# Patient Record
Sex: FEMALE | Race: WHITE | URBAN - METROPOLITAN AREA
[De-identification: names, ages, dates, MRNs, and addresses within clinical notes are randomized per-mention and may not be internally consistent; named-entity substitution may affect disease eponyms.]

---

## 2020-03-02 ENCOUNTER — INPATIENT (INPATIENT)
Facility: HOSPITAL | Age: 65
LOS: 3 days | Discharge: HOME | DRG: 247 | End: 2020-03-06
Attending: INTERNAL MEDICINE | Admitting: FAMILY MEDICINE
Payer: MEDICARE

## 2020-03-02 VITALS
HEART RATE: 88 BPM | DIASTOLIC BLOOD PRESSURE: 91 MMHG | SYSTOLIC BLOOD PRESSURE: 176 MMHG | RESPIRATION RATE: 16 BRPM | WEIGHT: 199.96 LBS | OXYGEN SATURATION: 98 % | HEIGHT: 63 IN

## 2020-03-02 DIAGNOSIS — I21.4 NON-ST ELEVATION (NSTEMI) MYOCARDIAL INFARCTION: ICD-10-CM

## 2020-03-02 DIAGNOSIS — I21.3 ST ELEVATION (STEMI) MYOCARDIAL INFARCTION OF UNSPECIFIED SITE: ICD-10-CM

## 2020-03-02 LAB
ADD ON TEST-SPECIMEN IN LAB: SIGNIFICANT CHANGE UP
ALBUMIN SERPL ELPH-MCNC: 3.1 G/DL — LOW (ref 3.3–5)
ALBUMIN SERPL ELPH-MCNC: 3.5 G/DL — SIGNIFICANT CHANGE UP (ref 3.3–5)
ALBUMIN SERPL ELPH-MCNC: 4 G/DL — SIGNIFICANT CHANGE UP (ref 3.3–5)
ALP SERPL-CCNC: 52 U/L — SIGNIFICANT CHANGE UP (ref 40–120)
ALP SERPL-CCNC: 60 U/L — SIGNIFICANT CHANGE UP (ref 40–120)
ALP SERPL-CCNC: 72 U/L — SIGNIFICANT CHANGE UP (ref 40–120)
ALT FLD-CCNC: 26 U/L — SIGNIFICANT CHANGE UP (ref 12–78)
ALT FLD-CCNC: 28 U/L — SIGNIFICANT CHANGE UP (ref 12–78)
ALT FLD-CCNC: 45 U/L — SIGNIFICANT CHANGE UP (ref 12–78)
ANION GAP SERPL CALC-SCNC: 6 MMOL/L — SIGNIFICANT CHANGE UP (ref 5–17)
ANION GAP SERPL CALC-SCNC: 7 MMOL/L — SIGNIFICANT CHANGE UP (ref 5–17)
ANION GAP SERPL CALC-SCNC: 8 MMOL/L — SIGNIFICANT CHANGE UP (ref 5–17)
ANION GAP SERPL CALC-SCNC: 9 MMOL/L — SIGNIFICANT CHANGE UP (ref 5–17)
AST SERPL-CCNC: 184 U/L — HIGH (ref 15–37)
AST SERPL-CCNC: 20 U/L — SIGNIFICANT CHANGE UP (ref 15–37)
AST SERPL-CCNC: 54 U/L — HIGH (ref 15–37)
BILIRUB SERPL-MCNC: 0.2 MG/DL — SIGNIFICANT CHANGE UP (ref 0.2–1.2)
BILIRUB SERPL-MCNC: 0.3 MG/DL — SIGNIFICANT CHANGE UP (ref 0.2–1.2)
BILIRUB SERPL-MCNC: 0.3 MG/DL — SIGNIFICANT CHANGE UP (ref 0.2–1.2)
BUN SERPL-MCNC: 12 MG/DL — SIGNIFICANT CHANGE UP (ref 7–23)
BUN SERPL-MCNC: 13 MG/DL — SIGNIFICANT CHANGE UP (ref 7–23)
BUN SERPL-MCNC: 14 MG/DL — SIGNIFICANT CHANGE UP (ref 7–23)
BUN SERPL-MCNC: 19 MG/DL — SIGNIFICANT CHANGE UP (ref 7–23)
CALCIUM SERPL-MCNC: 10.2 MG/DL — HIGH (ref 8.5–10.1)
CALCIUM SERPL-MCNC: 7.6 MG/DL — LOW (ref 8.5–10.1)
CALCIUM SERPL-MCNC: 8.5 MG/DL — SIGNIFICANT CHANGE UP (ref 8.5–10.1)
CALCIUM SERPL-MCNC: 8.9 MG/DL — SIGNIFICANT CHANGE UP (ref 8.5–10.1)
CHLORIDE SERPL-SCNC: 107 MMOL/L — SIGNIFICANT CHANGE UP (ref 96–108)
CHLORIDE SERPL-SCNC: 108 MMOL/L — SIGNIFICANT CHANGE UP (ref 96–108)
CHLORIDE SERPL-SCNC: 109 MMOL/L — HIGH (ref 96–108)
CHLORIDE SERPL-SCNC: 110 MMOL/L — HIGH (ref 96–108)
CO2 SERPL-SCNC: 21 MMOL/L — LOW (ref 22–31)
CO2 SERPL-SCNC: 24 MMOL/L — SIGNIFICANT CHANGE UP (ref 22–31)
CO2 SERPL-SCNC: 24 MMOL/L — SIGNIFICANT CHANGE UP (ref 22–31)
CO2 SERPL-SCNC: 25 MMOL/L — SIGNIFICANT CHANGE UP (ref 22–31)
CREAT SERPL-MCNC: 0.58 MG/DL — SIGNIFICANT CHANGE UP (ref 0.5–1.3)
CREAT SERPL-MCNC: 0.63 MG/DL — SIGNIFICANT CHANGE UP (ref 0.5–1.3)
CREAT SERPL-MCNC: 0.68 MG/DL — SIGNIFICANT CHANGE UP (ref 0.5–1.3)
CREAT SERPL-MCNC: 0.91 MG/DL — SIGNIFICANT CHANGE UP (ref 0.5–1.3)
GLUCOSE SERPL-MCNC: 115 MG/DL — HIGH (ref 70–99)
GLUCOSE SERPL-MCNC: 127 MG/DL — HIGH (ref 70–99)
GLUCOSE SERPL-MCNC: 139 MG/DL — HIGH (ref 70–99)
GLUCOSE SERPL-MCNC: 272 MG/DL — HIGH (ref 70–99)
HCT VFR BLD CALC: 37.7 % — SIGNIFICANT CHANGE UP (ref 34.5–45)
HCT VFR BLD CALC: 40.5 % — SIGNIFICANT CHANGE UP (ref 34.5–45)
HGB BLD-MCNC: 12.9 G/DL — SIGNIFICANT CHANGE UP (ref 11.5–15.5)
HGB BLD-MCNC: 13.9 G/DL — SIGNIFICANT CHANGE UP (ref 11.5–15.5)
LIDOCAIN IGE QN: 82 U/L — SIGNIFICANT CHANGE UP (ref 73–393)
MAGNESIUM SERPL-MCNC: 2.1 MG/DL — SIGNIFICANT CHANGE UP (ref 1.6–2.6)
MAGNESIUM SERPL-MCNC: 2.1 MG/DL — SIGNIFICANT CHANGE UP (ref 1.6–2.6)
MAGNESIUM SERPL-MCNC: 2.2 MG/DL — SIGNIFICANT CHANGE UP (ref 1.6–2.6)
MCHC RBC-ENTMCNC: 30.2 PG — SIGNIFICANT CHANGE UP (ref 27–34)
MCHC RBC-ENTMCNC: 30.4 PG — SIGNIFICANT CHANGE UP (ref 27–34)
MCHC RBC-ENTMCNC: 34.2 GM/DL — SIGNIFICANT CHANGE UP (ref 32–36)
MCHC RBC-ENTMCNC: 34.3 GM/DL — SIGNIFICANT CHANGE UP (ref 32–36)
MCV RBC AUTO: 87.9 FL — SIGNIFICANT CHANGE UP (ref 80–100)
MCV RBC AUTO: 88.9 FL — SIGNIFICANT CHANGE UP (ref 80–100)
NT-PROBNP SERPL-SCNC: 45 PG/ML — SIGNIFICANT CHANGE UP (ref 0–125)
PHOSPHATE SERPL-MCNC: 3.1 MG/DL — SIGNIFICANT CHANGE UP (ref 2.5–4.5)
PLATELET # BLD AUTO: 206 K/UL — SIGNIFICANT CHANGE UP (ref 150–400)
PLATELET # BLD AUTO: 268 K/UL — SIGNIFICANT CHANGE UP (ref 150–400)
POTASSIUM SERPL-MCNC: 3 MMOL/L — LOW (ref 3.5–5.3)
POTASSIUM SERPL-MCNC: 3.5 MMOL/L — SIGNIFICANT CHANGE UP (ref 3.5–5.3)
POTASSIUM SERPL-MCNC: 3.6 MMOL/L — SIGNIFICANT CHANGE UP (ref 3.5–5.3)
POTASSIUM SERPL-MCNC: 4 MMOL/L — SIGNIFICANT CHANGE UP (ref 3.5–5.3)
POTASSIUM SERPL-SCNC: 3 MMOL/L — LOW (ref 3.5–5.3)
POTASSIUM SERPL-SCNC: 3.5 MMOL/L — SIGNIFICANT CHANGE UP (ref 3.5–5.3)
POTASSIUM SERPL-SCNC: 3.6 MMOL/L — SIGNIFICANT CHANGE UP (ref 3.5–5.3)
POTASSIUM SERPL-SCNC: 4 MMOL/L — SIGNIFICANT CHANGE UP (ref 3.5–5.3)
PROT SERPL-MCNC: 5.7 GM/DL — LOW (ref 6–8.3)
PROT SERPL-MCNC: 7 GM/DL — SIGNIFICANT CHANGE UP (ref 6–8.3)
PROT SERPL-MCNC: 7.6 GM/DL — SIGNIFICANT CHANGE UP (ref 6–8.3)
RBC # BLD: 4.24 M/UL — SIGNIFICANT CHANGE UP (ref 3.8–5.2)
RBC # BLD: 4.61 M/UL — SIGNIFICANT CHANGE UP (ref 3.8–5.2)
RBC # FLD: 13.1 % — SIGNIFICANT CHANGE UP (ref 10.3–14.5)
RBC # FLD: 13.2 % — SIGNIFICANT CHANGE UP (ref 10.3–14.5)
SODIUM SERPL-SCNC: 138 MMOL/L — SIGNIFICANT CHANGE UP (ref 135–145)
SODIUM SERPL-SCNC: 139 MMOL/L — SIGNIFICANT CHANGE UP (ref 135–145)
SODIUM SERPL-SCNC: 139 MMOL/L — SIGNIFICANT CHANGE UP (ref 135–145)
SODIUM SERPL-SCNC: 142 MMOL/L — SIGNIFICANT CHANGE UP (ref 135–145)
TROPONIN I SERPL-MCNC: 0.05 NG/ML — HIGH (ref 0.01–0.04)
TROPONIN I SERPL-MCNC: 52 NG/ML — HIGH (ref 0.01–0.04)
WBC # BLD: 7.07 K/UL — SIGNIFICANT CHANGE UP (ref 3.8–10.5)
WBC # BLD: 9.08 K/UL — SIGNIFICANT CHANGE UP (ref 3.8–10.5)
WBC # FLD AUTO: 7.07 K/UL — SIGNIFICANT CHANGE UP (ref 3.8–10.5)
WBC # FLD AUTO: 9.08 K/UL — SIGNIFICANT CHANGE UP (ref 3.8–10.5)

## 2020-03-02 PROCEDURE — C9600: CPT | Mod: LD

## 2020-03-02 PROCEDURE — 93458 L HRT ARTERY/VENTRICLE ANGIO: CPT | Mod: 59

## 2020-03-02 PROCEDURE — 84443 ASSAY THYROID STIM HORMONE: CPT

## 2020-03-02 PROCEDURE — 80053 COMPREHEN METABOLIC PANEL: CPT

## 2020-03-02 PROCEDURE — 80048 BASIC METABOLIC PNL TOTAL CA: CPT

## 2020-03-02 PROCEDURE — 82550 ASSAY OF CK (CPK): CPT

## 2020-03-02 PROCEDURE — 83735 ASSAY OF MAGNESIUM: CPT

## 2020-03-02 PROCEDURE — 83036 HEMOGLOBIN GLYCOSYLATED A1C: CPT

## 2020-03-02 PROCEDURE — 99223 1ST HOSP IP/OBS HIGH 75: CPT

## 2020-03-02 PROCEDURE — 92920 PRQ TRLUML C ANGIOP 1ART&/BR: CPT | Mod: LD,78

## 2020-03-02 PROCEDURE — 99221 1ST HOSP IP/OBS SF/LOW 40: CPT

## 2020-03-02 PROCEDURE — 85025 COMPLETE CBC W/AUTO DIFF WBC: CPT

## 2020-03-02 PROCEDURE — 93308 TTE F-UP OR LMTD: CPT

## 2020-03-02 PROCEDURE — 86803 HEPATITIS C AB TEST: CPT

## 2020-03-02 PROCEDURE — 71045 X-RAY EXAM CHEST 1 VIEW: CPT

## 2020-03-02 PROCEDURE — 36415 COLL VENOUS BLD VENIPUNCTURE: CPT

## 2020-03-02 PROCEDURE — 93010 ELECTROCARDIOGRAM REPORT: CPT | Mod: 76

## 2020-03-02 PROCEDURE — 93005 ELECTROCARDIOGRAM TRACING: CPT

## 2020-03-02 PROCEDURE — 84100 ASSAY OF PHOSPHORUS: CPT

## 2020-03-02 PROCEDURE — G0269: CPT

## 2020-03-02 PROCEDURE — 93306 TTE W/DOPPLER COMPLETE: CPT | Mod: 26

## 2020-03-02 PROCEDURE — 84484 ASSAY OF TROPONIN QUANT: CPT

## 2020-03-02 PROCEDURE — 71045 X-RAY EXAM CHEST 1 VIEW: CPT | Mod: 26

## 2020-03-02 PROCEDURE — 80061 LIPID PANEL: CPT

## 2020-03-02 RX ORDER — CLOPIDOGREL BISULFATE 75 MG/1
75 TABLET, FILM COATED ORAL DAILY
Refills: 0 | Status: DISCONTINUED | OUTPATIENT
Start: 2020-03-03 | End: 2020-03-06

## 2020-03-02 RX ORDER — MAGNESIUM SULFATE 500 MG/ML
2 VIAL (ML) INJECTION ONCE
Refills: 0 | Status: COMPLETED | OUTPATIENT
Start: 2020-03-02 | End: 2020-03-02

## 2020-03-02 RX ORDER — NITROGLYCERIN 6.5 MG
0.4 CAPSULE, EXTENDED RELEASE ORAL
Refills: 0 | Status: DISCONTINUED | OUTPATIENT
Start: 2020-03-02 | End: 2020-03-06

## 2020-03-02 RX ORDER — LIDOCAINE HCL 20 MG/ML
100 VIAL (ML) INJECTION ONCE
Refills: 0 | Status: COMPLETED | OUTPATIENT
Start: 2020-03-02 | End: 2020-03-02

## 2020-03-02 RX ORDER — CLOPIDOGREL BISULFATE 75 MG/1
300 TABLET, FILM COATED ORAL ONCE
Refills: 0 | Status: DISCONTINUED | OUTPATIENT
Start: 2020-03-02 | End: 2020-03-02

## 2020-03-02 RX ORDER — HEPARIN SODIUM 5000 [USP'U]/ML
5000 INJECTION INTRAVENOUS; SUBCUTANEOUS EVERY 12 HOURS
Refills: 0 | Status: DISCONTINUED | OUTPATIENT
Start: 2020-03-02 | End: 2020-03-06

## 2020-03-02 RX ORDER — METOPROLOL TARTRATE 50 MG
25 TABLET ORAL
Refills: 0 | Status: DISCONTINUED | OUTPATIENT
Start: 2020-03-02 | End: 2020-03-04

## 2020-03-02 RX ORDER — ZOLPIDEM TARTRATE 10 MG/1
1 TABLET ORAL
Qty: 0 | Refills: 0 | DISCHARGE

## 2020-03-02 RX ORDER — ATORVASTATIN CALCIUM 80 MG/1
40 TABLET, FILM COATED ORAL AT BEDTIME
Refills: 0 | Status: DISCONTINUED | OUTPATIENT
Start: 2020-03-02 | End: 2020-03-06

## 2020-03-02 RX ORDER — POTASSIUM CHLORIDE 20 MEQ
10 PACKET (EA) ORAL
Refills: 0 | Status: COMPLETED | OUTPATIENT
Start: 2020-03-02 | End: 2020-03-02

## 2020-03-02 RX ORDER — MORPHINE SULFATE 50 MG/1
4 CAPSULE, EXTENDED RELEASE ORAL ONCE
Refills: 0 | Status: DISCONTINUED | OUTPATIENT
Start: 2020-03-02 | End: 2020-03-02

## 2020-03-02 RX ORDER — CLOPIDOGREL BISULFATE 75 MG/1
600 TABLET, FILM COATED ORAL ONCE
Refills: 0 | Status: COMPLETED | OUTPATIENT
Start: 2020-03-02 | End: 2020-03-02

## 2020-03-02 RX ORDER — ZOLPIDEM TARTRATE 10 MG/1
5 TABLET ORAL AT BEDTIME
Refills: 0 | Status: DISCONTINUED | OUTPATIENT
Start: 2020-03-02 | End: 2020-03-06

## 2020-03-02 RX ORDER — ONDANSETRON 8 MG/1
4 TABLET, FILM COATED ORAL ONCE
Refills: 0 | Status: COMPLETED | OUTPATIENT
Start: 2020-03-02 | End: 2020-03-02

## 2020-03-02 RX ORDER — PANTOPRAZOLE SODIUM 20 MG/1
40 TABLET, DELAYED RELEASE ORAL
Refills: 0 | Status: DISCONTINUED | OUTPATIENT
Start: 2020-03-02 | End: 2020-03-03

## 2020-03-02 RX ORDER — ACETAMINOPHEN 500 MG
650 TABLET ORAL EVERY 6 HOURS
Refills: 0 | Status: DISCONTINUED | OUTPATIENT
Start: 2020-03-02 | End: 2020-03-06

## 2020-03-02 RX ORDER — LISINOPRIL 2.5 MG/1
5 TABLET ORAL DAILY
Refills: 0 | Status: DISCONTINUED | OUTPATIENT
Start: 2020-03-02 | End: 2020-03-06

## 2020-03-02 RX ORDER — NITROGLYCERIN 6.5 MG
0.4 CAPSULE, EXTENDED RELEASE ORAL ONCE
Refills: 0 | Status: COMPLETED | OUTPATIENT
Start: 2020-03-02 | End: 2020-03-02

## 2020-03-02 RX ORDER — LEVOTHYROXINE SODIUM 125 MCG
125 TABLET ORAL DAILY
Refills: 0 | Status: DISCONTINUED | OUTPATIENT
Start: 2020-03-02 | End: 2020-03-06

## 2020-03-02 RX ORDER — LIDOCAINE HCL 20 MG/ML
1 VIAL (ML) INJECTION
Qty: 2 | Refills: 0 | Status: DISCONTINUED | OUTPATIENT
Start: 2020-03-02 | End: 2020-03-02

## 2020-03-02 RX ORDER — SODIUM CHLORIDE 9 MG/ML
1000 INJECTION INTRAMUSCULAR; INTRAVENOUS; SUBCUTANEOUS
Refills: 0 | Status: DISCONTINUED | OUTPATIENT
Start: 2020-03-02 | End: 2020-03-02

## 2020-03-02 RX ORDER — DEXTROSE MONOHYDRATE, SODIUM CHLORIDE, AND POTASSIUM CHLORIDE 50; .745; 4.5 G/1000ML; G/1000ML; G/1000ML
1000 INJECTION, SOLUTION INTRAVENOUS
Refills: 0 | Status: DISCONTINUED | OUTPATIENT
Start: 2020-03-02 | End: 2020-03-03

## 2020-03-02 RX ORDER — ASPIRIN/CALCIUM CARB/MAGNESIUM 324 MG
81 TABLET ORAL DAILY
Refills: 0 | Status: DISCONTINUED | OUTPATIENT
Start: 2020-03-03 | End: 2020-03-06

## 2020-03-02 RX ADMIN — ONDANSETRON 4 MILLIGRAM(S): 8 TABLET, FILM COATED ORAL at 13:10

## 2020-03-02 RX ADMIN — Medication 0.4 MILLIGRAM(S): at 15:40

## 2020-03-02 RX ADMIN — Medication 100 MILLIEQUIVALENT(S): at 15:50

## 2020-03-02 RX ADMIN — MORPHINE SULFATE 4 MILLIGRAM(S): 50 CAPSULE, EXTENDED RELEASE ORAL at 13:10

## 2020-03-02 RX ADMIN — CLOPIDOGREL BISULFATE 600 MILLIGRAM(S): 75 TABLET, FILM COATED ORAL at 13:12

## 2020-03-02 RX ADMIN — Medication 7.5 MG/MIN: at 17:11

## 2020-03-02 RX ADMIN — DEXTROSE MONOHYDRATE, SODIUM CHLORIDE, AND POTASSIUM CHLORIDE 75 MILLILITER(S): 50; .745; 4.5 INJECTION, SOLUTION INTRAVENOUS at 17:58

## 2020-03-02 RX ADMIN — LISINOPRIL 5 MILLIGRAM(S): 2.5 TABLET ORAL at 23:13

## 2020-03-02 RX ADMIN — Medication 100 MILLIGRAM(S): at 15:30

## 2020-03-02 RX ADMIN — Medication 25 MILLIGRAM(S): at 20:03

## 2020-03-02 RX ADMIN — Medication 100 MILLIEQUIVALENT(S): at 17:59

## 2020-03-02 RX ADMIN — ATORVASTATIN CALCIUM 40 MILLIGRAM(S): 80 TABLET, FILM COATED ORAL at 22:24

## 2020-03-02 RX ADMIN — ZOLPIDEM TARTRATE 5 MILLIGRAM(S): 10 TABLET ORAL at 22:24

## 2020-03-02 RX ADMIN — Medication 100 MILLIEQUIVALENT(S): at 21:00

## 2020-03-02 RX ADMIN — Medication 50 GRAM(S): at 15:40

## 2020-03-02 NOTE — ED PROVIDER NOTE - CRITICAL CARE PROVIDED
direct patient care (not related to procedure)/conducted a detailed discussion of DNR status/documentation

## 2020-03-02 NOTE — CHART NOTE - NSCHARTNOTEFT_GEN_A_CORE
Patient educated on and offered Cardiac Rehab. Patient given flyer and will make her decision upon discharge. Scribe Attestation (For Scribes USE Only)... Attending Attestation (For Attendings USE Only).../Scribe Attestation (For Scribes USE Only)...

## 2020-03-02 NOTE — H&P ADULT - NSHPSOCIALHISTORY_GEN_ALL_CORE
Pt works in real-estate,  is recently  and lives alone.   Rare alcohol use.  Remote second hand smoke exposure.  No tobacco/drug use.
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
## 2020-03-02 NOTE — PACU DISCHARGE NOTE - COMMENTS
report given to Elizabeth ZEE in CCU pt transferred to CCU on zoll with RN Betty Mendez. pt instructed to keep head down and leg straight
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
## 2020-03-02 NOTE — CHART NOTE - NSCHARTNOTEFT_GEN_A_CORE
6F cath removed from right groin at 2050 by writer. 20mins of direct pressure applied. Hemostasis achieved. Site is without hematoma or bleeding, surrounding area soft.  Sensation and ROM intact. Distal pulses palpable, 2+ capillary refill  < 2secs. Patient denies pain, numbness, tingling, CP or SOB . Clean dry dressing applied. Patient tolerated without any complications
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
## 2020-03-02 NOTE — ED ADULT TRIAGE NOTE - CHIEF COMPLAINT QUOTE
Pt BIBEMS s/p STEMI notification, EKG transmitted from [facility] at 12:44 pm, EMS notification at 12:51, ASA given, EMS arrival at 12:57 pm   Pt trauma 3 and cath lab NP's present

## 2020-03-02 NOTE — CONSULT NOTE ADULT - ASSESSMENT
64 y/o F with PMHx of acid reflux, HTN, hypothyroid presented to ED with chest pain found to be lateral wall STEMI, s/p TYLER x1 on D1 this morning. While recovery in holding, pt developed chest pain and torsade, required shock x1, also noted to be ST elevation in lateral leads. Return to cath lab and found acute stent thrombosis in D1. Now, s/p PTCA on D1.     # STEMI, s/p TYLER x1 and s/p PTCA on D1  - CCU monitoring  - Echo to assess structure and EF  - IV hydration with NS + 20 KCL at 75 cc/ hr   - KCL 10 meq IVPB x3 (K 3.5)    - trend Troponin q 8hrs x3  - CBC/BMP/ Mg post cath, repeat in AM  - start ASA 81 mg and Plavix 75 mg daily start tomorrow (given Plavix loading 600 mg in ED, ASA given en route)  - start Metoprolol 2.5 mg BID  - start lisinopril 5 mg daily   - start Lipitor 40 mg daily  - discontinued Lido gtt post 2nd procedure (given lido 100 mg bolus x1 at the event of torsade and infusion 7.5 mg/ min for 1 hr)  - risk modification including diet, exercise, weight control etc   - follow up with Dr. Mena     Plan was discussed with Dr. Rivers, Dr. Severino, Dr. Flores, Pt/ family and cath RN. 64 y/o F with PMHx of acid reflux, HTN, hypothyroid presented to ED with chest pain found to be lateral wall STEMI, s/p TYLER x1 on D1 this morning. While recovery in holding, pt developed chest pain and torsade, required shock x1, also noted to be ST elevation in lateral leads. Return to cath lab and found acute stent thrombosis in D1. Now, s/p PTCA on D1.     # STEMI, s/p TYLER x1 and s/p PTCA on D1  - CCU monitoring  - Echo to assess structure and EF  - IV hydration with NS + 20 KCL at 75 cc/ hr   - KCL 10 meq IVPB x3 (K 3.5)    - trend Troponin q 8hrs x3  - CBC/BMP/ Mg post cath, repeat in AM  - start ASA 81 mg and Plavix 75 mg daily start tomorrow (given Plavix loading 600 mg in ED, ASA given en route)  - start Metoprolol 2.5 mg BID  - start lisinopril 5 mg daily   - start Lipitor 40 mg daily  - discontinued Lido gtt post 2nd procedure (given lido 100 mg bolus x1 at the event of torsade and infusion 7.5 mg/ min for ~1 hr)  - risk modification including diet, exercise, weight control etc   - follow up with Dr. Mena in am     Plan was discussed with Dr. Rivers, Dr. Severino, Dr. Flores, Pt/ family and cath RN.

## 2020-03-02 NOTE — PROVIDER CONTACT NOTE (CHANGE IN STATUS NOTIFICATION) - SITUATION
Pt. c/o left sided chest pain w/ subsequent loss of consciousness and torsades on cardiac monitor at 1510; CPR initiated by DARCIE Avelar RN and defib. shock of 120 joules delivered at 1516 w/ conversion to SR w/ freq. PVC's, freq. PAC's on cardiac monitor and return of consciousness w/ pt. alert and answering questions appropriately.  Manuel Cuevas NP, SARITA Yanez NP and Dr. Rm present and at bedside.  Dr. Kasper notified of above and present at bedside w/ Dr. Garcia.  EKG obtained; Magnesium, 1 gram, IV, given at 1525; Lidocaine, 100 mg, IV, bolus given at 1530; pt. remains in SR w/ freq. PAC's, PVC's on cardiac monitor w/ ongoing c/o mild left sided chest pain; Nitro, 0.4mg, given sublingual at 1540; lidocaine infusion initiated at 1mg/hr at 1545; KCL, 10 MEQ, IV, given at 1550.  Pt. demon. SR w/ occas. PAC's, PVC's on cardiac monitor w/ pt. report of ongoing, decreased intensity of chest pain.  Dr. Rivers present at bedside at 1620 and pt. taken for repeat cardiac catheterization.
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
## 2020-03-02 NOTE — H&P ADULT - NSHPPHYSICALEXAM_GEN_ALL_CORE
ICU Vital Signs Last 24 Hrs    T(F): --  HR: 88 (02 Mar 2020 12:58) (88 - 88)  BP: 176/91 (02 Mar 2020 12:58) (176/91 - 176/91)    RR: 16 (02 Mar 2020 12:58) (16 - 16)  SpO2: 98% (02 Mar 2020 12:58) (98% - 98%) ICU Vital Signs Last 24 Hrs    T(F): 98  HR: 88 (02 Mar 2020 12:58) (88 - 88)  BP: 176/91 (02 Mar 2020 12:58) (176/91 - 176/91)    RR: 16 (02 Mar 2020 12:58) (16 - 16)  SpO2: 98% (02 Mar 2020 12:58) (98% - 98%)

## 2020-03-02 NOTE — H&P ADULT - NSHPLABSRESULTS_GEN_ALL_CORE
13:03 EKG: NSR 76 bpm, ST elevation in I, avL, with ST depression in III, avF, V1-V6    Post- cath after stent of V1 diagonal  14:45 NSR, LAD, I and avL less ST elevation , EKG improved    Around 15:00 pt had an episode of Torsades and required CPR, Shock x1, lidocaine bolus, 2 gm Mag    15:37 EKG: SR pvsc 99bpm, I , avL ST Elevation again worse    Repeat Cath showed 100% restenosis of stent in V1 diagonal, ballon angioplasty was done    17:32 EKG: NSR 95 bpm    (see cath reports)

## 2020-03-02 NOTE — PROGRESS NOTE ADULT - ASSESSMENT
A/P: 65 female who presented with a STEMI and post stent VT.    Plan:  EKG  Magnesium 2GM  KCL 10 x 3 runs  lido bolus an drip  EP at bedside  Stat BMP and add on mg to AM labs  patient returning to the cath lab  CCU post cath

## 2020-03-02 NOTE — PROVIDER CONTACT NOTE (CHANGE IN STATUS NOTIFICATION) - ACTION/TREATMENT ORDERED:
Pt. transferred to cardiac cath lab for repeat cardiac cath procedure w/ Dr. Rivers at 1620; awake, alert and agreeable w/ verb. of understanding to assessment, treatment plan.  Pt's sister present at bedside and aware of above and agreeable to treatment plan.

## 2020-03-02 NOTE — ED PROVIDER NOTE - DISPOSITION TYPE
Subjective   Lola Allen is a 53 y.o. female.     Cough   This is a new problem. Episode onset: 3 days. The problem has been unchanged. The problem occurs every few minutes. The cough is non-productive. Associated symptoms include postnasal drip and rhinorrhea. Pertinent negatives include no chills, ear pain, fever, headaches, myalgias, sore throat, shortness of breath or wheezing. The symptoms are aggravated by cold air. Risk factors for lung disease include smoking/tobacco exposure. She has tried OTC cough suppressant for the symptoms. The treatment provided no relief. There is no history of asthma, bronchitis or pneumonia.   Nausea   This is a new problem. Episode onset: 3 days. The problem occurs intermittently. The problem has been waxing and waning. Associated symptoms include anorexia, congestion, coughing (severe, persistent), fatigue and nausea. Pertinent negatives include no abdominal pain, arthralgias, chills, fever, headaches, myalgias, sore throat, swollen glands, vomiting or weakness. The symptoms are aggravated by coughing. She has tried nothing for the symptoms. The treatment provided no relief.        The following portions of the patient's history were reviewed and updated as appropriate: allergies, current medications, past medical history, past social history, past surgical history and problem list.    Review of Systems   Constitutional: Positive for fatigue. Negative for appetite change, chills and fever.   HENT: Positive for congestion, postnasal drip and rhinorrhea. Negative for ear pain, sinus pressure, sneezing, sore throat and trouble swallowing.    Eyes: Negative.    Respiratory: Positive for cough (severe, persistent). Negative for chest tightness, shortness of breath and wheezing.    Cardiovascular: Negative.    Gastrointestinal: Positive for anorexia and nausea. Negative for abdominal pain, diarrhea and vomiting.   Musculoskeletal: Negative for arthralgias and myalgias.   Skin:  "Negative.    Neurological: Negative.  Negative for weakness and headaches.   Hematological: Negative for adenopathy.        Pulse 77  Temp 97.3 °F (36.3 °C) (Temporal Artery )   Resp 15  Ht 157.5 cm (62\")  Wt 62.1 kg (136 lb 12.8 oz)  LMP  (LMP Unknown)  SpO2 98%  BMI 25.02 kg/m2     Objective   Physical Exam   Constitutional: She is oriented to person, place, and time. Vital signs are normal. She appears well-developed and well-nourished. No distress.   HENT:   Head: Normocephalic.   Right Ear: Tympanic membrane, external ear and ear canal normal. No drainage, swelling or tenderness. Tympanic membrane is not erythematous and not bulging.   Left Ear: Tympanic membrane, external ear and ear canal normal. No drainage, swelling or tenderness. Tympanic membrane is not erythematous and not bulging.   Nose: Mucosal edema and rhinorrhea present. Right sinus exhibits no maxillary sinus tenderness and no frontal sinus tenderness. Left sinus exhibits no maxillary sinus tenderness and no frontal sinus tenderness.   Mouth/Throat: Uvula is midline, oropharynx is clear and moist and mucous membranes are normal. Tonsils are 0 on the right. Tonsils are 0 on the left. No tonsillar exudate.   Neck: Normal range of motion. Neck supple.   Cardiovascular: Normal rate, regular rhythm, S1 normal, S2 normal and normal heart sounds.    Pulmonary/Chest: Effort normal and breath sounds normal. No respiratory distress. She has no wheezes.   Abdominal: Soft. Bowel sounds are normal. She exhibits no distension. There is tenderness in the epigastric area. There is no rebound and no guarding.   Lymphadenopathy:        Head (right side): No tonsillar adenopathy present.        Head (left side): No tonsillar adenopathy present.     She has no cervical adenopathy.   Neurological: She is alert and oriented to person, place, and time.   Skin: Skin is warm and dry. No rash noted. She is not diaphoretic.   Psychiatric: She has a normal mood and " affect. Her speech is normal and behavior is normal. Thought content normal.   Vitals reviewed.      Assessment/Plan   Lola was seen today for cough and nausea.    Diagnoses and all orders for this visit:    Coughing  -     benzonatate (TESSALON) 200 MG capsule; Take 1 capsule by mouth 3 (Three) Times a Day As Needed for Cough for up to 10 days.  -     brompheniramine-pseudoephedrine-DM 30-2-10 MG/5ML syrup; Take 5 mL by mouth 4 (Four) Times a Day As Needed for Cough for up to 5 days.    Nausea  -     promethazine (PHENERGAN) 12.5 MG tablet; Take 1 tablet by mouth Every 6 (Six) Hours As Needed for Nausea or Vomiting for up to 5 days.                ADMIT

## 2020-03-02 NOTE — ED ADULT NURSE NOTE - CHIEF COMPLAINT QUOTE
Pt BIBEMS s/p STEMI notification, EKG transmitted from [email or web address] at 12:44 pm, EMS notification at 12:51, ASA given, EMS arrival at 12:57 pm   Pt trauma 3 and cath lab NP's present

## 2020-03-02 NOTE — H&P ADULT - ASSESSMENT
Pt is admitted w/    STEMI  pos troponin  Elevated glucose  - s/p cardiac cath, stent  - cardiology consult  - check HgA1C  - DVT prophylaxis : heparin  - IMPROVE VTE Individual Risk Assessment    RISK                                                                Points    [  ] Previous VTE                                                  3    [  ] Thrombophilia                                               2    [  ] Lower limb paralysis                                      2        (unable to hold up >15 seconds)      [  ] Current Cancer                                              2         (within 6 months)    [  ] Immobilization > 24 hrs                                1    [  ] ICU/CCU stay > 24 hours                              1    [X  ] Age > 60                                                      1    IMPROVE VTE Score ____1_____    IMPROVE Score 0-1: Low Risk, No VTE prophylaxis required for most patients, encourage ambulation.   IMPROVE Score 2-3: At risk, pharmacologic VTE prophylaxis is indicated for most patients (in the absence of a contraindication)  IMPROVE Score > or = 4: High Risk, pharmacologic VTE prophylaxis is indicated for most patients (in the absence of a contraindication) Pt is admitted w/    STEMI  pos troponin  Elevated glucose  Obesity  - s/p ASA 81mg x6, plavix 300mg loaded  - s/p cardiac cath, stent of v1 diagonal  - cardiology consult appreciated  - s/p episode of Torsades and Code Blue   - s/p  CPR,  Shock x1, lidocaine bolus, 2 gm Mag, metoprolol  - s/p K riders to replete potassium  - s/p repeat cath revealed restenosis of stent of v1 diagonal,  ballon angioplasty  - cont ASA, plavix, statin  - cont IVF 75 ml/hr NS  - check HgA1C, and lipid panel, cbc, BMP in AM  - pt will need close follow up for grief assoc depression and stress  - DVT prophylaxis : heparin  - IMPROVE VTE Individual Risk Assessment    RISK                                                                Points    [  ] Previous VTE                                                  3    [  ] Thrombophilia                                               2    [  ] Lower limb paralysis                                      2        (unable to hold up >15 seconds)      [  ] Current Cancer                                              2         (within 6 months)    [  ] Immobilization > 24 hrs                                1    [  ] ICU/CCU stay > 24 hours                              1    [X  ] Age > 60                                                      1    IMPROVE VTE Score ____1_____    IMPROVE Score 0-1: Low Risk, No VTE prophylaxis required for most patients, encourage ambulation.   IMPROVE Score 2-3: At risk, pharmacologic VTE prophylaxis is indicated for most patients (in the absence of a contraindication)  IMPROVE Score > or = 4: High Risk, pharmacologic VTE prophylaxis is indicated for most patients (in the absence of a contraindication)

## 2020-03-03 LAB
ANION GAP SERPL CALC-SCNC: 7 MMOL/L — SIGNIFICANT CHANGE UP (ref 5–17)
BASOPHILS # BLD AUTO: 0.02 K/UL — SIGNIFICANT CHANGE UP (ref 0–0.2)
BASOPHILS NFR BLD AUTO: 0.2 % — SIGNIFICANT CHANGE UP (ref 0–2)
BUN SERPL-MCNC: 12 MG/DL — SIGNIFICANT CHANGE UP (ref 7–23)
CALCIUM SERPL-MCNC: 9.2 MG/DL — SIGNIFICANT CHANGE UP (ref 8.5–10.1)
CHLORIDE SERPL-SCNC: 108 MMOL/L — SIGNIFICANT CHANGE UP (ref 96–108)
CHOLEST SERPL-MCNC: 200 MG/DL — HIGH (ref 10–199)
CK SERPL-CCNC: 970 U/L — HIGH (ref 26–192)
CO2 SERPL-SCNC: 25 MMOL/L — SIGNIFICANT CHANGE UP (ref 22–31)
CREAT SERPL-MCNC: 0.57 MG/DL — SIGNIFICANT CHANGE UP (ref 0.5–1.3)
EOSINOPHIL # BLD AUTO: 0.01 K/UL — SIGNIFICANT CHANGE UP (ref 0–0.5)
EOSINOPHIL NFR BLD AUTO: 0.1 % — SIGNIFICANT CHANGE UP (ref 0–6)
GLUCOSE SERPL-MCNC: 94 MG/DL — SIGNIFICANT CHANGE UP (ref 70–99)
HBA1C BLD-MCNC: 5.7 % — HIGH (ref 4–5.6)
HCT VFR BLD CALC: 35.7 % — SIGNIFICANT CHANGE UP (ref 34.5–45)
HCV AB S/CO SERPL IA: 0.1 S/CO — SIGNIFICANT CHANGE UP (ref 0–0.99)
HCV AB SERPL-IMP: SIGNIFICANT CHANGE UP
HDLC SERPL-MCNC: 40 MG/DL — LOW
HGB BLD-MCNC: 12.1 G/DL — SIGNIFICANT CHANGE UP (ref 11.5–15.5)
IMM GRANULOCYTES NFR BLD AUTO: 0.2 % — SIGNIFICANT CHANGE UP (ref 0–1.5)
LIPID PNL WITH DIRECT LDL SERPL: 138 MG/DL — HIGH
LYMPHOCYTES # BLD AUTO: 2.05 K/UL — SIGNIFICANT CHANGE UP (ref 1–3.3)
LYMPHOCYTES # BLD AUTO: 21.6 % — SIGNIFICANT CHANGE UP (ref 13–44)
MCHC RBC-ENTMCNC: 30.3 PG — SIGNIFICANT CHANGE UP (ref 27–34)
MCHC RBC-ENTMCNC: 33.9 GM/DL — SIGNIFICANT CHANGE UP (ref 32–36)
MCV RBC AUTO: 89.3 FL — SIGNIFICANT CHANGE UP (ref 80–100)
MONOCYTES # BLD AUTO: 0.77 K/UL — SIGNIFICANT CHANGE UP (ref 0–0.9)
MONOCYTES NFR BLD AUTO: 8.1 % — SIGNIFICANT CHANGE UP (ref 2–14)
NEUTROPHILS # BLD AUTO: 6.62 K/UL — SIGNIFICANT CHANGE UP (ref 1.8–7.4)
NEUTROPHILS NFR BLD AUTO: 69.8 % — SIGNIFICANT CHANGE UP (ref 43–77)
PLATELET # BLD AUTO: 235 K/UL — SIGNIFICANT CHANGE UP (ref 150–400)
POTASSIUM SERPL-MCNC: 4 MMOL/L — SIGNIFICANT CHANGE UP (ref 3.5–5.3)
POTASSIUM SERPL-SCNC: 4 MMOL/L — SIGNIFICANT CHANGE UP (ref 3.5–5.3)
RBC # BLD: 4 M/UL — SIGNIFICANT CHANGE UP (ref 3.8–5.2)
RBC # FLD: 13.2 % — SIGNIFICANT CHANGE UP (ref 10.3–14.5)
SODIUM SERPL-SCNC: 140 MMOL/L — SIGNIFICANT CHANGE UP (ref 135–145)
TOTAL CHOLESTEROL/HDL RATIO MEASUREMENT: 5 RATIO — SIGNIFICANT CHANGE UP (ref 3.3–7.1)
TRIGL SERPL-MCNC: 110 MG/DL — SIGNIFICANT CHANGE UP (ref 10–149)
TROPONIN I SERPL-MCNC: 31.9 NG/ML — HIGH (ref 0.01–0.04)
TROPONIN I SERPL-MCNC: 38.6 NG/ML — HIGH (ref 0.01–0.04)
WBC # BLD: 9.49 K/UL — SIGNIFICANT CHANGE UP (ref 3.8–10.5)
WBC # FLD AUTO: 9.49 K/UL — SIGNIFICANT CHANGE UP (ref 3.8–10.5)

## 2020-03-03 PROCEDURE — 99233 SBSQ HOSP IP/OBS HIGH 50: CPT

## 2020-03-03 PROCEDURE — 93010 ELECTROCARDIOGRAM REPORT: CPT

## 2020-03-03 RX ORDER — LANOLIN ALCOHOL/MO/W.PET/CERES
5 CREAM (GRAM) TOPICAL ONCE
Refills: 0 | Status: COMPLETED | OUTPATIENT
Start: 2020-03-03 | End: 2020-03-03

## 2020-03-03 RX ORDER — MORPHINE SULFATE 50 MG/1
1 CAPSULE, EXTENDED RELEASE ORAL ONCE
Refills: 0 | Status: DISCONTINUED | OUTPATIENT
Start: 2020-03-03 | End: 2020-03-03

## 2020-03-03 RX ORDER — PANTOPRAZOLE SODIUM 20 MG/1
40 TABLET, DELAYED RELEASE ORAL
Refills: 0 | Status: DISCONTINUED | OUTPATIENT
Start: 2020-03-03 | End: 2020-03-06

## 2020-03-03 RX ORDER — ONDANSETRON 8 MG/1
4 TABLET, FILM COATED ORAL EVERY 8 HOURS
Refills: 0 | Status: DISCONTINUED | OUTPATIENT
Start: 2020-03-03 | End: 2020-03-06

## 2020-03-03 RX ADMIN — Medication 0.4 MILLIGRAM(S): at 17:47

## 2020-03-03 RX ADMIN — CLOPIDOGREL BISULFATE 75 MILLIGRAM(S): 75 TABLET, FILM COATED ORAL at 12:55

## 2020-03-03 RX ADMIN — ATORVASTATIN CALCIUM 40 MILLIGRAM(S): 80 TABLET, FILM COATED ORAL at 21:57

## 2020-03-03 RX ADMIN — Medication 125 MICROGRAM(S): at 06:21

## 2020-03-03 RX ADMIN — Medication 81 MILLIGRAM(S): at 12:55

## 2020-03-03 RX ADMIN — ONDANSETRON 4 MILLIGRAM(S): 8 TABLET, FILM COATED ORAL at 21:56

## 2020-03-03 RX ADMIN — MORPHINE SULFATE 1 MILLIGRAM(S): 50 CAPSULE, EXTENDED RELEASE ORAL at 20:07

## 2020-03-03 RX ADMIN — Medication 30 MILLILITER(S): at 18:41

## 2020-03-03 RX ADMIN — Medication 0.5 MILLIGRAM(S): at 22:49

## 2020-03-03 RX ADMIN — Medication 0.4 MILLIGRAM(S): at 18:07

## 2020-03-03 RX ADMIN — MORPHINE SULFATE 1 MILLIGRAM(S): 50 CAPSULE, EXTENDED RELEASE ORAL at 20:45

## 2020-03-03 RX ADMIN — MORPHINE SULFATE 1 MILLIGRAM(S): 50 CAPSULE, EXTENDED RELEASE ORAL at 18:40

## 2020-03-03 RX ADMIN — HEPARIN SODIUM 5000 UNIT(S): 5000 INJECTION INTRAVENOUS; SUBCUTANEOUS at 17:43

## 2020-03-03 RX ADMIN — PANTOPRAZOLE SODIUM 40 MILLIGRAM(S): 20 TABLET, DELAYED RELEASE ORAL at 06:21

## 2020-03-03 RX ADMIN — ZOLPIDEM TARTRATE 5 MILLIGRAM(S): 10 TABLET ORAL at 21:57

## 2020-03-03 RX ADMIN — Medication 25 MILLIGRAM(S): at 17:43

## 2020-03-03 RX ADMIN — PANTOPRAZOLE SODIUM 40 MILLIGRAM(S): 20 TABLET, DELAYED RELEASE ORAL at 21:57

## 2020-03-03 RX ADMIN — MORPHINE SULFATE 1 MILLIGRAM(S): 50 CAPSULE, EXTENDED RELEASE ORAL at 19:56

## 2020-03-03 RX ADMIN — HEPARIN SODIUM 5000 UNIT(S): 5000 INJECTION INTRAVENOUS; SUBCUTANEOUS at 06:21

## 2020-03-03 RX ADMIN — Medication 5 MILLIGRAM(S): at 21:57

## 2020-03-03 RX ADMIN — ONDANSETRON 4 MILLIGRAM(S): 8 TABLET, FILM COATED ORAL at 18:41

## 2020-03-03 RX ADMIN — Medication 25 MILLIGRAM(S): at 06:21
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
## 2020-03-03 NOTE — PROVIDER CONTACT NOTE (OTHER) - BACKGROUND
Patient s/p TYLER to Diag on 3/2. Patient previously mediated with IV morphine, IV zofran, maalox, protonix.

## 2020-03-03 NOTE — CONSULT NOTE ADULT - REASON FOR ADMISSION
STEMI  Chest Pain  pos troponin  Elevated glucose
STEMI  Chest Pain  pos troponin  Elevated glucose
STEMI  pos troponin  Elevated glucose

## 2020-03-03 NOTE — CONSULT NOTE ADULT - ASSESSMENT
ASSESSMENT & PLAN:  6 y/o with STEMII and Ventricular Arrhythmias.. She has had an  acute lateral MI (with ST elevation in I and AVL) and had a PCI this admission.  Had Torsades  and Code Blue was called. S/P  repeat cath revealed restenosis of stent of v1 diagonal,  balloon angioplasty.   She is presently on ASA Lipitor, Plavix, Lisinopril and Metoprolol.    Maintain K above 4 and Mg level above 2  Obtain ECHO results   Continue with BB, anticholesterolemic medication, Plavix  and ASA  Monitor Hgb A1C with a goal of less then 6.5  Monitor TSH and T4 level and continue with levothyroxine      Thank-you for letting the EP Service  participate in the care of your patient.
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
## 2020-03-03 NOTE — CONSULT NOTE ADULT - ASSESSMENT
- s/p ASA 81mg x6, plavix 300mg loaded  - s/p cardiac cath, stent of v1 diagonal  - s/p episode of Torsades and Code Blue   - s/p  CPR,  Shock x1, lidocaine bolus, 2 gm Mag, metoprolol  - s/p K riders to replete potassium  - s/p repeat cath revealed restenosis of stent of v1 diagonal,  ballon angioplasty  - cont ASA, plavix, statin  - cont IVF 75 ml/hr NS  - check HgA1C, and lipid panel, cbc, BMP in AM  - pt will need close follow up for grief assoc depression and stress  - DVT prophylaxis : heparin  - IMPROVE VTE Individual Risk Assessment
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
## 2020-03-03 NOTE — PROVIDER CONTACT NOTE (OTHER) - SITUATION
Notified Dr. Mcgowan office regarding consult spoke with Sima from answering service.
Notified Dr. Mena office regarding consult spoke with Magui from answering service.
Patient having 8/10 chest pain.
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
## 2020-03-04 LAB — TROPONIN I SERPL-MCNC: 13.4 NG/ML — HIGH (ref 0.01–0.04)

## 2020-03-04 PROCEDURE — 99233 SBSQ HOSP IP/OBS HIGH 50: CPT

## 2020-03-04 PROCEDURE — 93010 ELECTROCARDIOGRAM REPORT: CPT

## 2020-03-04 RX ORDER — METOPROLOL TARTRATE 50 MG
50 TABLET ORAL EVERY 12 HOURS
Refills: 0 | Status: DISCONTINUED | OUTPATIENT
Start: 2020-03-04 | End: 2020-03-05

## 2020-03-04 RX ORDER — METOPROLOL TARTRATE 50 MG
25 TABLET ORAL ONCE
Refills: 0 | Status: COMPLETED | OUTPATIENT
Start: 2020-03-04 | End: 2020-03-04

## 2020-03-04 RX ORDER — LANOLIN ALCOHOL/MO/W.PET/CERES
5 CREAM (GRAM) TOPICAL AT BEDTIME
Refills: 0 | Status: COMPLETED | OUTPATIENT
Start: 2020-03-04 | End: 2020-03-05

## 2020-03-04 RX ADMIN — ATORVASTATIN CALCIUM 40 MILLIGRAM(S): 80 TABLET, FILM COATED ORAL at 21:21

## 2020-03-04 RX ADMIN — Medication 25 MILLIGRAM(S): at 07:53

## 2020-03-04 RX ADMIN — CLOPIDOGREL BISULFATE 75 MILLIGRAM(S): 75 TABLET, FILM COATED ORAL at 11:50

## 2020-03-04 RX ADMIN — ZOLPIDEM TARTRATE 5 MILLIGRAM(S): 10 TABLET ORAL at 22:28

## 2020-03-04 RX ADMIN — Medication 81 MILLIGRAM(S): at 11:50

## 2020-03-04 RX ADMIN — PANTOPRAZOLE SODIUM 40 MILLIGRAM(S): 20 TABLET, DELAYED RELEASE ORAL at 17:37

## 2020-03-04 RX ADMIN — LISINOPRIL 5 MILLIGRAM(S): 2.5 TABLET ORAL at 05:37

## 2020-03-04 RX ADMIN — Medication 125 MICROGRAM(S): at 05:37

## 2020-03-04 RX ADMIN — HEPARIN SODIUM 5000 UNIT(S): 5000 INJECTION INTRAVENOUS; SUBCUTANEOUS at 05:37

## 2020-03-04 RX ADMIN — Medication 5 MILLIGRAM(S): at 22:29

## 2020-03-04 RX ADMIN — Medication 50 MILLIGRAM(S): at 17:37

## 2020-03-04 RX ADMIN — PANTOPRAZOLE SODIUM 40 MILLIGRAM(S): 20 TABLET, DELAYED RELEASE ORAL at 05:37

## 2020-03-04 RX ADMIN — HEPARIN SODIUM 5000 UNIT(S): 5000 INJECTION INTRAVENOUS; SUBCUTANEOUS at 17:37

## 2020-03-04 RX ADMIN — Medication 25 MILLIGRAM(S): at 05:37
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
## 2020-03-04 NOTE — PROGRESS NOTE ADULT - ASSESSMENT
64 y/o F with PMHx of acid reflux, HTN, HLD, Hysterectomy, R Oopherectomy, Obesity, hypothyroid who presents to the ED via EMS with chest pain and STEMI.    #STEMI:    S/p PCI to V1 Diag-- restenosis-- balloon angioplasty.    Recurrent CP 3/3 and 3/4 but EKGs/ trop unchanged.    Asa/ plavix/ statin/ bb/ ace.    #Torsades/ VT:    S/p cardiac arrest/ lidocaine/ metoprolol/ shock 3/2/20.    Cont BB.      #Obesity:    Nutrition eval.      ? D/c planning if stable 3/5. 66 y/o F with PMHx of acid reflux, HTN, HLD, Hysterectomy, R Oopherectomy, Obesity, hypothyroid who presents to the ED via EMS with chest pain and STEMI.    #STEMI:    S/p PCI to V1 Diag-- restenosis-- balloon angioplasty.    Recurrent CP 3/3 and 3/4 but EKGs/ trop unchanged.    Asa/ plavix/ statin/ bb/ ace.    #Torsades/ VT:    S/p cardiac arrest/ lidocaine/ metoprolol/ shock 3/2/20.    Cont BB.      #Obesity:    Nutrition eval.      #GERD:    Cont prtonix.      ? D/c planning if stable 3/5.
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
## 2020-03-04 NOTE — CHART NOTE - NSCHARTNOTEFT_GEN_A_CORE
EP following this 64 y/o female who had stent restenosis and polymorphic VT.  Dr. Mena increased her Metoprolol to 50 ms bid.  She complained of pain that she said felt similar to her sxs prior to arrival to .  Her Telemetry shows SR with occasional PVCs.  One reading of BP 83/65 after BB increased.  Will continue ti monitor tis pt and encourga fluids.  Pt appears comfortable while siting in bed and talking with her visitors.       e. EP following this 64 y/o female who had stent restenosis and polymorphic VT.  Dr. Mena increased her Metoprolol to 50 ms bid.  She complained of pain that she said felt similar to her sxs prior to arrival to .  Her Telemetry shows SR with occasional PVCs.  One reading of BP 83/65 after BB increased.  Will continue ti monitor tis pt and encourage fluids.  Pt appears comfortable while siting in bed and talking with her visitors.       portillo.

## 2020-03-05 PROCEDURE — 93010 ELECTROCARDIOGRAM REPORT: CPT

## 2020-03-05 PROCEDURE — 93308 TTE F-UP OR LMTD: CPT | Mod: 26

## 2020-03-05 PROCEDURE — 99232 SBSQ HOSP IP/OBS MODERATE 35: CPT

## 2020-03-05 RX ORDER — METOPROLOL TARTRATE 50 MG
37.5 TABLET ORAL
Refills: 0 | Status: DISCONTINUED | OUTPATIENT
Start: 2020-03-05 | End: 2020-03-06

## 2020-03-05 RX ADMIN — Medication 81 MILLIGRAM(S): at 12:32

## 2020-03-05 RX ADMIN — PANTOPRAZOLE SODIUM 40 MILLIGRAM(S): 20 TABLET, DELAYED RELEASE ORAL at 05:34

## 2020-03-05 RX ADMIN — CLOPIDOGREL BISULFATE 75 MILLIGRAM(S): 75 TABLET, FILM COATED ORAL at 12:32

## 2020-03-05 RX ADMIN — ZOLPIDEM TARTRATE 5 MILLIGRAM(S): 10 TABLET ORAL at 22:07

## 2020-03-05 RX ADMIN — HEPARIN SODIUM 5000 UNIT(S): 5000 INJECTION INTRAVENOUS; SUBCUTANEOUS at 05:34

## 2020-03-05 RX ADMIN — LISINOPRIL 5 MILLIGRAM(S): 2.5 TABLET ORAL at 12:32

## 2020-03-05 RX ADMIN — Medication 125 MICROGRAM(S): at 05:33

## 2020-03-05 RX ADMIN — ATORVASTATIN CALCIUM 40 MILLIGRAM(S): 80 TABLET, FILM COATED ORAL at 21:11

## 2020-03-05 RX ADMIN — PANTOPRAZOLE SODIUM 40 MILLIGRAM(S): 20 TABLET, DELAYED RELEASE ORAL at 17:46

## 2020-03-05 RX ADMIN — Medication 37.5 MILLIGRAM(S): at 17:44

## 2020-03-05 RX ADMIN — Medication 5 MILLIGRAM(S): at 22:07

## 2020-03-05 RX ADMIN — Medication 50 MILLIGRAM(S): at 05:33
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
## 2020-03-05 NOTE — CHART NOTE - NSCHARTNOTEFT_GEN_A_CORE
Pt has no complaints and states that she feels much better. CM:  SR at 70 BPM, BP 80-90 systolic.    EP following this 64 y/o female who had  Coronary stent restenosis and polymorphic VT, in the setting of hypokalemia.   Dr. Mena increased her Metoprolol to 50 ms bid.  She complained of pain that she said felt similar to her sxs prior to arrival to .  Her Telemetry shows SR with heart rate in the 70- 80's rare unifocal PVCs    As per Dr. Mcgowan decrease Metroprolol tartrate to 37.5 mgs bid (due to hypotenison)      Will continue ti monitor tis pt and encourage fluids.  Pt appears comfortable while siting in bed and talking with her visitors. Pt has no complaints and states that she feels much better. CM:  SR at 70 BPM, BP 80-90 systolic.    EP following this 64 y/o female who had  Coronary stent restenosis and polymorphic VT, in the setting of hypokalemia.   Dr. Mena increased her Metoprolol to 50 ms bid.  She complained of pain that she said felt similar to her sxs prior to arrival to .  Her Telemetry shows SR with heart rate in the 70- 80's rare unifocal PVCs    As per Dr. Mcgowan decrease Metroprolol tartrate to 37.5 mgs bid (due to hypotension)      Will continue ti monitor tis pt and encourage fluids.  Pt appears comfortable while siting in bed and talking with her visitors.
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
## 2020-03-06 ENCOUNTER — TRANSCRIPTION ENCOUNTER (OUTPATIENT)
Age: 65
End: 2020-03-06

## 2020-03-06 VITALS — TEMPERATURE: 98 F

## 2020-03-06 PROCEDURE — 99239 HOSP IP/OBS DSCHRG MGMT >30: CPT

## 2020-03-06 RX ORDER — AMLODIPINE BESYLATE 2.5 MG/1
1 TABLET ORAL
Qty: 0 | Refills: 0 | DISCHARGE

## 2020-03-06 RX ORDER — ATORVASTATIN CALCIUM 80 MG/1
1 TABLET, FILM COATED ORAL
Qty: 30 | Refills: 0
Start: 2020-03-06

## 2020-03-06 RX ORDER — IBUPROFEN 200 MG
2 TABLET ORAL
Qty: 0 | Refills: 0 | DISCHARGE

## 2020-03-06 RX ORDER — ASPIRIN/CALCIUM CARB/MAGNESIUM 324 MG
1 TABLET ORAL
Qty: 0 | Refills: 0 | DISCHARGE
Start: 2020-03-06

## 2020-03-06 RX ORDER — ZOLPIDEM TARTRATE 10 MG/1
1 TABLET ORAL
Qty: 0 | Refills: 0 | DISCHARGE

## 2020-03-06 RX ORDER — METOPROLOL TARTRATE 50 MG
1.5 TABLET ORAL
Qty: 90 | Refills: 0
Start: 2020-03-06 | End: 2020-04-04

## 2020-03-06 RX ORDER — LISINOPRIL 2.5 MG/1
1 TABLET ORAL
Qty: 30 | Refills: 0
Start: 2020-03-06

## 2020-03-06 RX ORDER — CLOPIDOGREL BISULFATE 75 MG/1
1 TABLET, FILM COATED ORAL
Qty: 30 | Refills: 0
Start: 2020-03-06

## 2020-03-06 RX ADMIN — PANTOPRAZOLE SODIUM 40 MILLIGRAM(S): 20 TABLET, DELAYED RELEASE ORAL at 06:20

## 2020-03-06 RX ADMIN — LISINOPRIL 5 MILLIGRAM(S): 2.5 TABLET ORAL at 10:41

## 2020-03-06 RX ADMIN — CLOPIDOGREL BISULFATE 75 MILLIGRAM(S): 75 TABLET, FILM COATED ORAL at 10:41

## 2020-03-06 RX ADMIN — Medication 37.5 MILLIGRAM(S): at 08:22

## 2020-03-06 RX ADMIN — Medication 81 MILLIGRAM(S): at 10:42

## 2020-03-06 RX ADMIN — Medication 125 MICROGRAM(S): at 06:21

## 2020-03-06 NOTE — DISCHARGE NOTE PROVIDER - NSDCMRMEDTOKEN_GEN_ALL_CORE_FT
aspirin 81 mg oral tablet, chewable: 1 tab(s) orally once a day  atorvastatin 40 mg oral tablet: 1 tab(s) orally once a day (at bedtime)  clopidogrel 75 mg oral tablet: 1 tab(s) orally once a day  levothyroxine 125 mcg (0.125 mg) oral tablet: 1 tab(s) orally once a day  lisinopril 5 mg oral tablet: 1 tab(s) orally once a day  metoprolol tartrate 37.5 mg oral tablet: 1.5 tab(s) orally 2 times a day   Protonix 40 mg oral delayed release tablet: 1 tab(s) orally once a day  Vitamin B12: 1 tab(s) orally once a day  Vitamin B6: 1 tab(s) orally once a day  Vitamin D3 5000 intl units (125 mcg) oral tablet: 1 tab(s) orally once a day  Xanax 0.25 mg oral tablet: 1 tab(s) orally once a day, As Needed
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
## 2020-03-06 NOTE — DISCHARGE NOTE PROVIDER - CARE PROVIDER_API CALL
Ajay Mena)  Cardiovascular Disease; Internal Medicine  [address] [place] [address]  Phone: [phone]  Fax: [phone]  Follow Up Time: 1 week

## 2020-03-06 NOTE — DISCHARGE NOTE PROVIDER - NSDCCPCAREPLAN_GEN_ALL_CORE_FT
PRINCIPAL DISCHARGE DIAGNOSIS  Diagnosis: STEMI (ST elevation myocardial infarction)  Assessment and Plan of Treatment: continue all cardiac medications      SECONDARY DISCHARGE DIAGNOSES  Diagnosis: Torsades de pointes  Assessment and Plan of Treatment: secondary to heart attack

## 2020-03-06 NOTE — DISCHARGE NOTE NURSING/CASE MANAGEMENT/SOCIAL WORK - PATIENT PORTAL LINK FT
You can access the FollowMyHealth Patient Portal offered by [facility] by registering at the following website: [email or web address]. By joining [facility]’s FollowMyHealth portal, you will also be able to view your health information using other applications (apps) compatible with our system.

## 2020-03-06 NOTE — PROGRESS NOTE ADULT - REASON FOR ADMISSION
STEMI  Chest Pain  pos troponin  Elevated glucose
STEMI  pos troponin  Elevated glucose

## 2020-03-08 ENCOUNTER — INPATIENT (INPATIENT)
Facility: HOSPITAL | Age: 65
LOS: 0 days | Discharge: HOME | DRG: 281 | End: 2020-03-09
Attending: FAMILY MEDICINE | Admitting: FAMILY MEDICINE
Payer: MEDICARE

## 2020-03-08 VITALS — WEIGHT: 197.09 LBS | HEIGHT: 63 IN

## 2020-03-08 DIAGNOSIS — Z95.5 PRESENCE OF CORONARY ANGIOPLASTY IMPLANT AND GRAFT: Chronic | ICD-10-CM

## 2020-03-08 DIAGNOSIS — R00.2 PALPITATIONS: ICD-10-CM

## 2020-03-08 PROBLEM — E03.9 HYPOTHYROIDISM, UNSPECIFIED: Chronic | Status: ACTIVE | Noted: 2020-03-02

## 2020-03-08 PROBLEM — I10 ESSENTIAL (PRIMARY) HYPERTENSION: Chronic | Status: ACTIVE | Noted: 2020-03-02

## 2020-03-08 LAB
ALBUMIN SERPL ELPH-MCNC: 3.3 G/DL — SIGNIFICANT CHANGE UP (ref 3.3–5)
ALP SERPL-CCNC: 62 U/L — SIGNIFICANT CHANGE UP (ref 40–120)
ALT FLD-CCNC: 29 U/L — SIGNIFICANT CHANGE UP (ref 12–78)
ANION GAP SERPL CALC-SCNC: 6 MMOL/L — SIGNIFICANT CHANGE UP (ref 5–17)
AST SERPL-CCNC: 19 U/L — SIGNIFICANT CHANGE UP (ref 15–37)
BILIRUB SERPL-MCNC: 0.3 MG/DL — SIGNIFICANT CHANGE UP (ref 0.2–1.2)
BUN SERPL-MCNC: 17 MG/DL — SIGNIFICANT CHANGE UP (ref 7–23)
CALCIUM SERPL-MCNC: 9.9 MG/DL — SIGNIFICANT CHANGE UP (ref 8.5–10.1)
CHLORIDE SERPL-SCNC: 108 MMOL/L — SIGNIFICANT CHANGE UP (ref 96–108)
CO2 SERPL-SCNC: 25 MMOL/L — SIGNIFICANT CHANGE UP (ref 22–31)
CREAT SERPL-MCNC: 0.83 MG/DL — SIGNIFICANT CHANGE UP (ref 0.5–1.3)
GLUCOSE SERPL-MCNC: 140 MG/DL — HIGH (ref 70–99)
HCT VFR BLD CALC: 38.1 % — SIGNIFICANT CHANGE UP (ref 34.5–45)
HGB BLD-MCNC: 13.2 G/DL — SIGNIFICANT CHANGE UP (ref 11.5–15.5)
MAGNESIUM SERPL-MCNC: 2.1 MG/DL — SIGNIFICANT CHANGE UP (ref 1.6–2.6)
MCHC RBC-ENTMCNC: 31.1 PG — SIGNIFICANT CHANGE UP (ref 27–34)
MCHC RBC-ENTMCNC: 34.6 GM/DL — SIGNIFICANT CHANGE UP (ref 32–36)
MCV RBC AUTO: 89.6 FL — SIGNIFICANT CHANGE UP (ref 80–100)
NT-PROBNP SERPL-SCNC: 776 PG/ML — HIGH (ref 0–125)
PLATELET # BLD AUTO: 289 K/UL — SIGNIFICANT CHANGE UP (ref 150–400)
POTASSIUM SERPL-MCNC: 4 MMOL/L — SIGNIFICANT CHANGE UP (ref 3.5–5.3)
POTASSIUM SERPL-SCNC: 4 MMOL/L — SIGNIFICANT CHANGE UP (ref 3.5–5.3)
PROT SERPL-MCNC: 7.1 GM/DL — SIGNIFICANT CHANGE UP (ref 6–8.3)
RBC # BLD: 4.25 M/UL — SIGNIFICANT CHANGE UP (ref 3.8–5.2)
RBC # FLD: 13.1 % — SIGNIFICANT CHANGE UP (ref 10.3–14.5)
SODIUM SERPL-SCNC: 139 MMOL/L — SIGNIFICANT CHANGE UP (ref 135–145)
TROPONIN I SERPL-MCNC: 0.23 NG/ML — HIGH (ref 0.01–0.04)
TROPONIN I SERPL-MCNC: 0.28 NG/ML — HIGH (ref 0.01–0.04)
TROPONIN I SERPL-MCNC: 0.37 NG/ML — HIGH (ref 0.01–0.04)
WBC # BLD: 6.29 K/UL — SIGNIFICANT CHANGE UP (ref 3.8–10.5)
WBC # FLD AUTO: 6.29 K/UL — SIGNIFICANT CHANGE UP (ref 3.8–10.5)

## 2020-03-08 PROCEDURE — 85025 COMPLETE CBC W/AUTO DIFF WBC: CPT

## 2020-03-08 PROCEDURE — 80053 COMPREHEN METABOLIC PANEL: CPT

## 2020-03-08 PROCEDURE — 83735 ASSAY OF MAGNESIUM: CPT

## 2020-03-08 PROCEDURE — 36415 COLL VENOUS BLD VENIPUNCTURE: CPT

## 2020-03-08 PROCEDURE — 71046 X-RAY EXAM CHEST 2 VIEWS: CPT | Mod: 26

## 2020-03-08 PROCEDURE — 99285 EMERGENCY DEPT VISIT HI MDM: CPT | Mod: 25

## 2020-03-08 PROCEDURE — 99285 EMERGENCY DEPT VISIT HI MDM: CPT

## 2020-03-08 PROCEDURE — 84484 ASSAY OF TROPONIN QUANT: CPT

## 2020-03-08 PROCEDURE — 99223 1ST HOSP IP/OBS HIGH 75: CPT

## 2020-03-08 PROCEDURE — 84443 ASSAY THYROID STIM HORMONE: CPT

## 2020-03-08 PROCEDURE — 84100 ASSAY OF PHOSPHORUS: CPT

## 2020-03-08 RX ORDER — ASPIRIN/CALCIUM CARB/MAGNESIUM 324 MG
325 TABLET ORAL ONCE
Refills: 0 | Status: DISCONTINUED | OUTPATIENT
Start: 2020-03-08 | End: 2020-03-08

## 2020-03-08 RX ORDER — PREGABALIN 225 MG/1
1000 CAPSULE ORAL DAILY
Refills: 0 | Status: DISCONTINUED | OUTPATIENT
Start: 2020-03-08 | End: 2020-03-09

## 2020-03-08 RX ORDER — ATORVASTATIN CALCIUM 80 MG/1
40 TABLET, FILM COATED ORAL AT BEDTIME
Refills: 0 | Status: DISCONTINUED | OUTPATIENT
Start: 2020-03-08 | End: 2020-03-09

## 2020-03-08 RX ORDER — PANTOPRAZOLE SODIUM 20 MG/1
40 TABLET, DELAYED RELEASE ORAL
Refills: 0 | Status: DISCONTINUED | OUTPATIENT
Start: 2020-03-08 | End: 2020-03-09

## 2020-03-08 RX ORDER — METOPROLOL TARTRATE 50 MG
37.5 TABLET ORAL
Refills: 0 | Status: DISCONTINUED | OUTPATIENT
Start: 2020-03-08 | End: 2020-03-09

## 2020-03-08 RX ORDER — CLOPIDOGREL BISULFATE 75 MG/1
75 TABLET, FILM COATED ORAL DAILY
Refills: 0 | Status: DISCONTINUED | OUTPATIENT
Start: 2020-03-08 | End: 2020-03-09

## 2020-03-08 RX ORDER — PYRIDOXINE HCL (VITAMIN B6) 100 MG
100 TABLET ORAL DAILY
Refills: 0 | Status: DISCONTINUED | OUTPATIENT
Start: 2020-03-08 | End: 2020-03-09

## 2020-03-08 RX ORDER — ASPIRIN/CALCIUM CARB/MAGNESIUM 324 MG
81 TABLET ORAL DAILY
Refills: 0 | Status: DISCONTINUED | OUTPATIENT
Start: 2020-03-08 | End: 2020-03-09

## 2020-03-08 RX ORDER — LACTOBACILLUS ACIDOPHILUS 100MM CELL
1 CAPSULE ORAL DAILY
Refills: 0 | Status: DISCONTINUED | OUTPATIENT
Start: 2020-03-08 | End: 2020-03-09

## 2020-03-08 RX ORDER — CHOLECALCIFEROL (VITAMIN D3) 125 MCG
1000 CAPSULE ORAL DAILY
Refills: 0 | Status: DISCONTINUED | OUTPATIENT
Start: 2020-03-08 | End: 2020-03-09

## 2020-03-08 RX ORDER — ZOLPIDEM TARTRATE 10 MG/1
5 TABLET ORAL AT BEDTIME
Refills: 0 | Status: DISCONTINUED | OUTPATIENT
Start: 2020-03-08 | End: 2020-03-09

## 2020-03-08 RX ORDER — ALPRAZOLAM 0.25 MG
0.25 TABLET ORAL DAILY
Refills: 0 | Status: DISCONTINUED | OUTPATIENT
Start: 2020-03-08 | End: 2020-03-09

## 2020-03-08 RX ORDER — LISINOPRIL 2.5 MG/1
5 TABLET ORAL DAILY
Refills: 0 | Status: DISCONTINUED | OUTPATIENT
Start: 2020-03-08 | End: 2020-03-09

## 2020-03-08 RX ORDER — ASPIRIN/CALCIUM CARB/MAGNESIUM 324 MG
243 TABLET ORAL ONCE
Refills: 0 | Status: COMPLETED | OUTPATIENT
Start: 2020-03-08 | End: 2020-03-08

## 2020-03-08 RX ORDER — LANOLIN ALCOHOL/MO/W.PET/CERES
10 CREAM (GRAM) TOPICAL AT BEDTIME
Refills: 0 | Status: DISCONTINUED | OUTPATIENT
Start: 2020-03-08 | End: 2020-03-09

## 2020-03-08 RX ORDER — LEVOTHYROXINE SODIUM 125 MCG
125 TABLET ORAL DAILY
Refills: 0 | Status: DISCONTINUED | OUTPATIENT
Start: 2020-03-08 | End: 2020-03-09

## 2020-03-08 RX ADMIN — Medication 243 MILLIGRAM(S): at 14:46

## 2020-03-08 RX ADMIN — ZOLPIDEM TARTRATE 5 MILLIGRAM(S): 10 TABLET ORAL at 21:19

## 2020-03-08 RX ADMIN — ATORVASTATIN CALCIUM 40 MILLIGRAM(S): 80 TABLET, FILM COATED ORAL at 21:19

## 2020-03-08 RX ADMIN — Medication 10 MILLIGRAM(S): at 21:19

## 2020-03-08 RX ADMIN — Medication 37.5 MILLIGRAM(S): at 21:19

## 2020-03-08 NOTE — ED ADULT TRIAGE NOTE - CHIEF COMPLAINT QUOTE
Pt states she had an MI last week, admitted to CCU and discharged, started having heart palpitations last night 7-8pm, denies any pain.   To intake room for STAT EKG

## 2020-03-08 NOTE — ED ADULT NURSE NOTE - NSIMPLEMENTINTERV_GEN_ALL_ED
Implemented All Fall with Harm Risk Interventions:  [place] to call system. Call bell, personal items and telephone within reach. Instruct patient to call for assistance. Room bathroom lighting operational. Non-slip footwear when patient is off stretcher. Physically safe environment: no spills, clutter or unnecessary equipment. Stretcher in lowest position, wheels locked, appropriate side rails in place. Provide visual cue, wrist band, yellow gown, etc. Monitor gait and stability. Monitor for mental status changes and reorient to person, place, and time. Review medications for side effects contributing to fall risk. Reinforce activity limits and safety measures with patient and family. Provide visual clues: red socks.

## 2020-03-08 NOTE — ED PROVIDER NOTE - NS ED ROS FT
Review of Systems:  	•	CONSTITUTIONAL: no fever  	•	SKIN: no rash  	•	RESPIRATORY: no shortness of breath  	•	CARDIAC: no chest pain, no palpitations  	•	GI:  no abd pain, no nausea, no vomiting, no diarrhea  	•	GENITO-URINARY:  no dysuria; no hematuria    	•	MUSCULOSKELETAL:  no back pain  	•	NEUROLOGIC: no weakness, +Lightheadedness  	•	ALLERGY: no rhinitis  	•	PSYSCHIATRIC: no anxiety

## 2020-03-08 NOTE — H&P ADULT - NSHPPHYSICALEXAM_GEN_ALL_CORE
Vital Signs Last 24 Hrs  T(C): 36.8 (08 Mar 2020 18:26), Max: 36.8 (08 Mar 2020 18:26)  T(F): 98.3 (08 Mar 2020 18:26), Max: 98.3 (08 Mar 2020 18:26)  HR: 75 (08 Mar 2020 18:26) (69 - 75)  BP: 105/61 (08 Mar 2020 18:26) (104/57 - 109/45)  BP(mean): 69 (08 Mar 2020 18:26) (62 - 69)  RR: 18 (08 Mar 2020 18:26) (18 - 20)  SpO2: 96% (08 Mar 2020 18:26) (96% - 96%)

## 2020-03-08 NOTE — ED PROVIDER NOTE - PROGRESS NOTE DETAILS
D/w Dr. Almanza, agreeable with plan to monitor repeat troponin, d/c home if continuing to trend down. If d/c'd recommended to increase metoprolol and have patient follow up in office. If troponin is trending up recommended admission. - TRENA BanerjeeC D/w Dr. Almanza, agreeable with plan to monitor repeat troponin, If d/c'd recommended to increase metoprolol and have patient follow up in office. If troponin is trending up recommended admission. - TRENA BanerjeeC pt having frequent pvc, some are back to back, given hx torsades about a week ago, will admit Pt displaying increasing PVCs on monitor. Also reporting "numbness/tingling in my jaw on the left side." Patient recommended for admission, accepted to  with Dr. Brady as accepting. - Breezy Blount PA-C

## 2020-03-08 NOTE — H&P ADULT - HISTORY OF PRESENT ILLNESS
65 year old female patient with pertinent PMH of recent STEMI and code blue in the cath lab presented to the ED after one day history of persistent palpitations associated with chest pressure and left facial numbness. Patient states she was discharged from the Hospital on 3/6/2020 but started taking prescribed medications on 3/7/202/ and noticed symptoms shortly after. Denied any dizziness, presyncope, sob, nausea, vomiting, numbness or tingling. Patient attempted to contact her Cardiologist unsuccessfully.     In the ED patient was found to have frequent PVC's and elevated troponins

## 2020-03-08 NOTE — ED PROVIDER NOTE - ATTENDING CONTRIBUTION TO CARE
I, Jeremy Cedillo MD,  performed the initial face to face bedside interview with this patient regarding history of present illness, review of symptoms and relevant past medical, social and family history.  I completed an independent physical examination.  I was the initial provider who evaluated this patient. I have signed out the follow up of any pending tests (i.e. labs, radiological studies) to the ACP.  The ACP will complete the work up, interpret tests, administer medications if necessary and disposition the patient appropriately.  If questions arise the ACP is expected to contact me for consultation.

## 2020-03-08 NOTE — ED ADULT TRIAGE NOTE - ARRIVAL FROM
Patient: Caterina Mason    Procedure Summary     Date:  12/10/19 Room / Location:  [facility] / [facility] CATH INVASIVE LOCATION    Anesthesia Start:  1150 Anesthesia Stop:  1432    Procedure:  pvc ablation (N/A ) Diagnosis:       Palpitations      PVC's (premature ventricular contractions)      (pvc's)    Provider:  Alfredo Iglesias MD Provider:  Varun Rivera MD    Anesthesia Type:  MAC ASA Status:  3          Anesthesia Type: MAC    Vitals  No vitals data found for the desired time range.          Post Anesthesia Care and Evaluation    Patient location during evaluation: bedside  Patient participation: complete - patient cannot participate  Level of consciousness: sleepy but conscious  Pain score: 0  Pain management: adequate  Airway patency: patent  Anesthetic complications: No anesthetic complications  PONV Status: none  Cardiovascular status: acceptable  Respiratory status: acceptable  Hydration status: acceptable       Home

## 2020-03-08 NOTE — PHARMACOTHERAPY INTERVENTION NOTE - COMMENTS
Completed medication history. Spoke with patient and referred to Dr. Tiwari. Patient confirmed they take zolpidem and melatonin 20mg(total) as sleep aid.

## 2020-03-08 NOTE — H&P ADULT - NSHPSOCIALHISTORY_GEN_ALL_CORE
Social History (marital status, living situation, occupation, tobacco use, alcohol and drug use, and sexual history): Pt works in real-estate,  is recently  and lives alone.   Rare alcohol use.  Remote second hand smoke exposure.  No tobacco/drug use.

## 2020-03-08 NOTE — ED STATDOCS - PROGRESS NOTE DETAILS
Chandrika MCCALL for ED attending Dr. Capone: 65 YOF PMHx of hypothyroid, CAD, MI, HTN presents to ED c/o palpitations since last night. Evaluated at ED on 03/02, found to have MI and admitted to CCU. Pt says she had a stent placement by Dr. Rivers with complication she was told may have been torsade and was defibrillated. Pt's medications were changed during her stay at  and pt adds she was found to have PVCs. Pt was discharged from  on 03/06. Around 19:00-20:00 last night pt felt "fluttering" in her chest that has been constant. PCP/Cardiologist: Rajesh. Will send pt to main ED for further evaluation. Chandrika MCCALL for ED attending Dr. Capone: 65 YOF PMHx of hypothyroid, CAD, MI, HTN presents to ED c/o palpitations since last night. Evaluated at ED on 03/02, found to have MI and admitted to CCU. Pt says she had a stent placement x 2 by Dr. Rivers due to complication with initial stent. Pt adds she went into torsades and was defibrillated. Pt's medications were changed during her stay at  and pt adds she was found to have PVCs. Pt was discharged from  on 03/06. Around 19:00-20:00 last night pt felt "fluttering" in her chest that has been constant. PCP/Cardiologist: Rajesh. Will send pt to main ED for further evaluation.

## 2020-03-08 NOTE — ED ADULT NURSE NOTE - OBJECTIVE STATEMENT
Pt states she had an MI last week, admitted to CCU and discharge. pt was in torsades and defibrillated. pt started having heart palpitations last night 7-8pm, denies JAYDA VIDAL. pt states she also felt light-headed this morning.

## 2020-03-08 NOTE — ED PROVIDER NOTE - OBJECTIVE STATEMENT
Pertinent HPI/PMH/PSH/FHx/SHx and Review of Systems contained within  HPI: 66 yo female p/w CC Palpitations last night. Pt felt "fluttering" in her chest that has been constant. Pt states she had an MI last week, admitted to CCU, went into torsades. Pt also went into code blue, had to be shocked in the ED. Discharged on 3/6. +lightheadedness. Unable to reach cardio last night. Concerned about meds, feels well otherwise. No LE swelling. Pt currently on Plavix, Metoprolol, Levothyroxine, Atorvastatin.  PCP/Cardiologist: Rajesh.   PMH/PSH relevant for: Hypothyroid, HTN, CAD, MI on 3/2/2020  ROS negative for: fever, Chest pain, SOB, Nausea, vomiting, diarrhea, abdominal pain, dysuria, no Pain    FamilyHx and SocialHx not otherwise contributory

## 2020-03-08 NOTE — ED PROVIDER NOTE - CLINICAL SUMMARY MEDICAL DECISION MAKING FREE TEXT BOX
EKG, labs, CXR, troponin x2. Page Dr Mena to discuss. consider admit to tele vs outpatient followup. EKG and monitors showing PVCs which may be causing palpitations. EKG, labs, CXR, troponin x2. Page Dr Mena to discuss. pt having frequent symptomatic pvc, some are back to back, given hx torsades about a week ago, will admit

## 2020-03-08 NOTE — PATIENT PROFILE ADULT - MONEY FOR FOOD
Place eye ointment to affected eye. Avoid touching affected eye and practice good hand hygiene.     Follow up with the eye doctor if no improvement in symptoms.    Return to emergency department for worsening or concerning symptoms.    no

## 2020-03-08 NOTE — H&P ADULT - ASSESSMENT
65 year old female patient with palpitations and chest pressure in the setting of recent MI found to have elevated troponins      -Admit to Telemetry      #Chest pain  -EKG and telemetry monitor noted for PVC  -serial ekgs  -monitor on tele  -trend troponin  -patient may need BB dose titrated. Will defer to cardiology  -cardiology evaluation      #Palpitations  -pvcs noted on tele and ekg  -may need medications titrated  -cardiology to evaluate pt    #Elevated troponins  -likely secondary to demand ischemia  -currently trending down  -trend troponins  -serial ekg  -monitor on tele    #CAD  -on asa, statin, BB and plavix    #HTN  -on lisinopril    #HLD  -on statin    #DVT ppx  encourage ambulation    #Disposition  -likely discharge after cardiology evaluation and clearance

## 2020-03-09 ENCOUNTER — TRANSCRIPTION ENCOUNTER (OUTPATIENT)
Age: 65
End: 2020-03-09

## 2020-03-09 VITALS
HEART RATE: 119 BPM | OXYGEN SATURATION: 94 % | DIASTOLIC BLOOD PRESSURE: 55 MMHG | SYSTOLIC BLOOD PRESSURE: 111 MMHG | TEMPERATURE: 98 F | RESPIRATION RATE: 18 BRPM

## 2020-03-09 LAB
ADD ON TEST-SPECIMEN IN LAB: SIGNIFICANT CHANGE UP
ALBUMIN SERPL ELPH-MCNC: 3.2 G/DL — LOW (ref 3.3–5)
ALP SERPL-CCNC: 57 U/L — SIGNIFICANT CHANGE UP (ref 40–120)
ALT FLD-CCNC: 29 U/L — SIGNIFICANT CHANGE UP (ref 12–78)
ANION GAP SERPL CALC-SCNC: 7 MMOL/L — SIGNIFICANT CHANGE UP (ref 5–17)
AST SERPL-CCNC: 19 U/L — SIGNIFICANT CHANGE UP (ref 15–37)
BASOPHILS # BLD AUTO: 0.03 K/UL — SIGNIFICANT CHANGE UP (ref 0–0.2)
BASOPHILS NFR BLD AUTO: 0.5 % — SIGNIFICANT CHANGE UP (ref 0–2)
BILIRUB SERPL-MCNC: 0.3 MG/DL — SIGNIFICANT CHANGE UP (ref 0.2–1.2)
BUN SERPL-MCNC: 20 MG/DL — SIGNIFICANT CHANGE UP (ref 7–23)
CALCIUM SERPL-MCNC: 9.4 MG/DL — SIGNIFICANT CHANGE UP (ref 8.5–10.1)
CHLORIDE SERPL-SCNC: 107 MMOL/L — SIGNIFICANT CHANGE UP (ref 96–108)
CO2 SERPL-SCNC: 25 MMOL/L — SIGNIFICANT CHANGE UP (ref 22–31)
CREAT SERPL-MCNC: 0.97 MG/DL — SIGNIFICANT CHANGE UP (ref 0.5–1.3)
EOSINOPHIL # BLD AUTO: 0.01 K/UL — SIGNIFICANT CHANGE UP (ref 0–0.5)
EOSINOPHIL NFR BLD AUTO: 0.2 % — SIGNIFICANT CHANGE UP (ref 0–6)
GLUCOSE SERPL-MCNC: 149 MG/DL — HIGH (ref 70–99)
HCT VFR BLD CALC: 36 % — SIGNIFICANT CHANGE UP (ref 34.5–45)
HGB BLD-MCNC: 12.3 G/DL — SIGNIFICANT CHANGE UP (ref 11.5–15.5)
IMM GRANULOCYTES NFR BLD AUTO: 0.3 % — SIGNIFICANT CHANGE UP (ref 0–1.5)
LYMPHOCYTES # BLD AUTO: 1.86 K/UL — SIGNIFICANT CHANGE UP (ref 1–3.3)
LYMPHOCYTES # BLD AUTO: 28.4 % — SIGNIFICANT CHANGE UP (ref 13–44)
MAGNESIUM SERPL-MCNC: 2 MG/DL — SIGNIFICANT CHANGE UP (ref 1.6–2.6)
MCHC RBC-ENTMCNC: 31.1 PG — SIGNIFICANT CHANGE UP (ref 27–34)
MCHC RBC-ENTMCNC: 34.2 GM/DL — SIGNIFICANT CHANGE UP (ref 32–36)
MCV RBC AUTO: 90.9 FL — SIGNIFICANT CHANGE UP (ref 80–100)
MONOCYTES # BLD AUTO: 0.36 K/UL — SIGNIFICANT CHANGE UP (ref 0–0.9)
MONOCYTES NFR BLD AUTO: 5.5 % — SIGNIFICANT CHANGE UP (ref 2–14)
NEUTROPHILS # BLD AUTO: 4.26 K/UL — SIGNIFICANT CHANGE UP (ref 1.8–7.4)
NEUTROPHILS NFR BLD AUTO: 65.1 % — SIGNIFICANT CHANGE UP (ref 43–77)
PHOSPHATE SERPL-MCNC: 3.5 MG/DL — SIGNIFICANT CHANGE UP (ref 2.5–4.5)
PLATELET # BLD AUTO: 285 K/UL — SIGNIFICANT CHANGE UP (ref 150–400)
POTASSIUM SERPL-MCNC: 3.8 MMOL/L — SIGNIFICANT CHANGE UP (ref 3.5–5.3)
POTASSIUM SERPL-SCNC: 3.8 MMOL/L — SIGNIFICANT CHANGE UP (ref 3.5–5.3)
PROT SERPL-MCNC: 6.8 GM/DL — SIGNIFICANT CHANGE UP (ref 6–8.3)
RBC # BLD: 3.96 M/UL — SIGNIFICANT CHANGE UP (ref 3.8–5.2)
RBC # FLD: 13.3 % — SIGNIFICANT CHANGE UP (ref 10.3–14.5)
SODIUM SERPL-SCNC: 139 MMOL/L — SIGNIFICANT CHANGE UP (ref 135–145)
TROPONIN I SERPL-MCNC: 0.26 NG/ML — HIGH (ref 0.01–0.04)
WBC # BLD: 6.54 K/UL — SIGNIFICANT CHANGE UP (ref 3.8–10.5)
WBC # FLD AUTO: 6.54 K/UL — SIGNIFICANT CHANGE UP (ref 3.8–10.5)

## 2020-03-09 PROCEDURE — 99239 HOSP IP/OBS DSCHRG MGMT >30: CPT

## 2020-03-09 RX ORDER — METOPROLOL TARTRATE 50 MG
1.5 TABLET ORAL
Qty: 0 | Refills: 0 | DISCHARGE

## 2020-03-09 RX ORDER — METOPROLOL TARTRATE 50 MG
12.5 TABLET ORAL ONCE
Refills: 0 | Status: COMPLETED | OUTPATIENT
Start: 2020-03-09 | End: 2020-03-09

## 2020-03-09 RX ADMIN — Medication 37.5 MILLIGRAM(S): at 05:08

## 2020-03-09 RX ADMIN — Medication 1 TABLET(S): at 10:04

## 2020-03-09 RX ADMIN — Medication 100 MILLIGRAM(S): at 10:05

## 2020-03-09 RX ADMIN — Medication 125 MICROGRAM(S): at 05:08

## 2020-03-09 RX ADMIN — LISINOPRIL 5 MILLIGRAM(S): 2.5 TABLET ORAL at 05:08

## 2020-03-09 RX ADMIN — CLOPIDOGREL BISULFATE 75 MILLIGRAM(S): 75 TABLET, FILM COATED ORAL at 10:04

## 2020-03-09 RX ADMIN — Medication 12.5 MILLIGRAM(S): at 10:03

## 2020-03-09 RX ADMIN — Medication 81 MILLIGRAM(S): at 10:04

## 2020-03-09 RX ADMIN — PREGABALIN 1000 MICROGRAM(S): 225 CAPSULE ORAL at 10:03

## 2020-03-09 RX ADMIN — Medication 1000 UNIT(S): at 10:06

## 2020-03-09 RX ADMIN — PANTOPRAZOLE SODIUM 40 MILLIGRAM(S): 20 TABLET, DELAYED RELEASE ORAL at 05:08

## 2020-03-09 NOTE — DISCHARGE NOTE PROVIDER - NSDCCPCAREPLAN_GEN_ALL_CORE_FT
PRINCIPAL DISCHARGE DIAGNOSIS  Diagnosis: Palpitation  Assessment and Plan of Treatment: oral hydration  wear monitor   C/w metoprolol increased dose  F/u with Dr Mena on 3/11 and Dr Mcgowan      SECONDARY DISCHARGE DIAGNOSES  Diagnosis: CAD (coronary artery disease)  Assessment and Plan of Treatment: c/w metoprolol  lipitor   plavix, ASA   F/u with Dr Mena PRINCIPAL DISCHARGE DIAGNOSIS  Diagnosis: Palpitation  Assessment and Plan of Treatment: oral hydration  wear monitor   C/w metoprolol increased dose  F/u with Dr Mena on 3/11 and Dr Mcgowan      SECONDARY DISCHARGE DIAGNOSES  Diagnosis: Hypothyroidism  Assessment and Plan of Treatment: c/w synthroid  recheck TSH with Dr Mena    Diagnosis: CAD (coronary artery disease)  Assessment and Plan of Treatment: c/w metoprolol  lipitor   plavix, ASA   F/u with Dr Mena

## 2020-03-09 NOTE — DISCHARGE NOTE NURSING/CASE MANAGEMENT/SOCIAL WORK - PATIENT PORTAL LINK FT
You can access the FollowMyHealth Patient Portal offered by [facility] by registering at the following website: [email or web address]. By joining [facility]’s FollowMyHealth portal, you will also be able to view your health information using other applications (apps) compatible with our system.

## 2020-03-09 NOTE — CONSULT NOTE ADULT - SUBJECTIVE AND OBJECTIVE BOX
CHIEF COMPLAINT:    HPI:  65 year old female patient with pertinent PMH of recent STEMI and code blue in the cath lab with torsades presented to the ED after one day history of persistent palpitations associated with chest pressure and left facial numbness. Patient states she was discharged from the Hospital on 3/6/2020 but started taking prescribed medications on 3/7/202/ and noticed symptoms shortly after. Denied any dizziness, presyncope, sob, nausea, vomiting, numbness or tingling.     In the ED patient was found to have frequent PVC's and elevated troponins (08 Mar 2020 19:49)    EKG on admssion showed 2 PVC's and previous lateral wall infarct. Troponin trending down since MI. Echo before D/C showed LVEF 60-65%. Lopressor was decreased by EP because of low BP, to 37.5 mg bid. Pt c/o palps but no chest pain. She wants to go home.  Sinus rhythm on monitor,  rate 72. /57. Spoke to EP who will give her a 21 day monitor/MCOT.  Increase Lopressor back to 50 mg bid. I told pt to increase fluid intake. F/U with me in office on Wednesday at 12:30 pm. Pt stable for D/C after EP sees her. Single PVC's without couplets or VT on monitor.       PAST MEDICAL & SURGICAL HISTORY:  Hypothyroid  HTN (hypertension)      Allergies    No Known Allergies    Intolerances        Occupation:  Alochol: Denied  Smoking: Denied  Drug Use: Denied  Marital Status:         FAMILY HISTORY:  FH: breast cancer      REVIEW OF SYSTEMS:    CONSTITUTIONAL: No weakness, fevers or chills  EYES/ENT: No visual changes;  No vertigo or throat pain   NECK: No pain or stiffness  RESPIRATORY: No cough, wheezing, hemoptysis; No shortness of breath  CARDIOVASCULAR: No chest pain or palpitations  GASTROINTESTINAL: No abdominal or epigastric pain. No nausea, vomiting, or hematemesis; No diarrhea or constipation. No melena or hematochezia.  GENITOURINARY: No dysuria, frequency or hematuria  NEUROLOGICAL: No numbness or weakness  SKIN: No itching, burning, rashes, or lesions   All other review of systems is negative unless indicated above    Vital Signs Last 24 Hrs  T(C): 36.3 (09 Mar 2020 05:05), Max: 36.8 (08 Mar 2020 18:26)  T(F): 97.4 (09 Mar 2020 05:05), Max: 98.3 (08 Mar 2020 18:26)  HR: 78 (09 Mar 2020 05:05) (58 - 78)  BP: 106/57 (09 Mar 2020 05:05) (104/57 - 111/56)  BP(mean): 69 (08 Mar 2020 18:26) (62 - 69)  RR: 18 (09 Mar 2020 05:05) (18 - 20)  SpO2: 100% (09 Mar 2020 05:05) (96% - 100%)    I&O's Summary      PHYSICAL EXAM:    Constitutional: NAD, awake and alert, well-developed  HEENT: PERR, EOMI,  No oral cyananosis.  Neck:  supple,  No JVD  Respiratory: Breath sounds are clear bilaterally, No wheezing, rales or rhonchi  Cardiovascular: S1 and S2, regular rate and rhythm, no Murmurs, gallops or rubs  Gastrointestinal: Bowel Sounds present, soft, nontender.   Extremities: No peripheral edema. No clubbing or cyanosis.  Vascular: 2+ peripheral pulses  Neurological: A/O x 3, no focal deficits  Musculoskeletal: no calf tenderness.  Skin: No rashes.    MEDICATIONS:  MEDICATIONS  (STANDING):  aspirin  chewable 81 milliGRAM(s) Oral daily  atorvastatin 40 milliGRAM(s) Oral at bedtime  cholecalciferol 1000 Unit(s) Oral daily  clopidogrel Tablet 75 milliGRAM(s) Oral daily  cyanocobalamin 1000 MICROGram(s) Oral daily  lactobacillus acidophilus 1 Tablet(s) Oral daily  levothyroxine 125 MICROGram(s) Oral daily  lisinopril 5 milliGRAM(s) Oral daily  melatonin 10 milliGRAM(s) Oral at bedtime  metoprolol tartrate 37.5 milliGRAM(s) Oral two times a day  metoprolol tartrate 12.5 milliGRAM(s) Oral once  pantoprazole    Tablet 40 milliGRAM(s) Oral before breakfast  pyridoxine 100 milliGRAM(s) Oral daily      LABS: All Labs Reviewed:                        13.2   6.29  )-----------( 289      ( 08 Mar 2020 11:27 )             38.1     08 Mar 2020 11:27    139    |  108    |  17     ----------------------------<  140    4.0     |  25     |  0.83     Ca    9.9        08 Mar 2020 11:27  Mg     2.1       08 Mar 2020 11:27    TPro  7.1    /  Alb  3.3    /  TBili  0.3    /  DBili  x      /  AST  19     /  ALT  29     /  AlkPhos  62     08 Mar 2020 11:27      CARDIAC MARKERS ( 08 Mar 2020 23:26 )  0.263 ng/mL / x     / x     / x     / x      CARDIAC MARKERS ( 08 Mar 2020 21:23 )  0.230 ng/mL / x     / x     / x     / x      CARDIAC MARKERS ( 08 Mar 2020 14:55 )  0.283 ng/mL / x     / x     / x     / x      CARDIAC MARKERS ( 08 Mar 2020 11:27 )  0.371 ng/mL / x     / x     / x     / x          Blood Culture:   03-08 @ 11:27  Pro Bnp [id number]        RADIOLOGY/EKG:

## 2020-03-09 NOTE — CHART NOTE - NSCHARTNOTEFT_GEN_A_CORE
EP was following on this patient.   She was admitted for palpitations over the weekend.  She is a  66 y/o female who had stent restenosis and polymorphic VT.  Dr. Mena increased her Metoprolol to 50 ms bid., Ep lowered it to 37.5 bid due to hypotension prior to her discharge last week. She was readmitted for palpitations, after two very stress related incidents at he house..   Her Telemetry shows SR with occasional PVCs. Will encourage fluids and to slat her food.  Pt appears comfortable while siting in bed.  EP will make arrangements for MCOT today and f/u appointment with Dr. Mcgowan.

## 2020-03-09 NOTE — CONSULT NOTE ADULT - ASSESSMENT
65 year old female patient with palpitations and chest pressure in the setting of recent MI found to have elevated troponins      -Admit to Telemetry      #Chest pain  -EKG and telemetry monitor noted for PVC  -serial ekgs  -monitor on tele  -trend troponin      #Palpitations  -pvcs noted on tele and ekg  -increase Lopressor back to 50 mg bid  -21 day MCOT by EP  -EP consult, after which pt can be D/Colt    #Elevated troponins  -due to previous lateral infarct. Peak troponin 60.2.  -currently trending down  -trend troponins  -serial ekg  -monitor on tele.     #CAD  -on asa, statin, BB and plavix and ACE    #HTN  -on lisinopril    #HLD  -on statin    #DVT ppx  encourage ambulation

## 2020-03-09 NOTE — DISCHARGE NOTE PROVIDER - NSDCMRMEDTOKEN_GEN_ALL_CORE_FT
aspirin 81 mg oral tablet, chewable: 1 tab(s) orally once a day  atorvastatin 40 mg oral tablet: 1 tab(s) orally once a day (at bedtime)  clopidogrel 75 mg oral tablet: 1 tab(s) orally once a day  levothyroxine 125 mcg (0.125 mg) oral tablet: 1 tab(s) orally once a day  lisinopril 5 mg oral tablet: 1 tab(s) orally once a day  Melatonin 10 mg oral capsule: 2 cap(s) orally once a day (at bedtime)  Metoprolol Tartrate 25 mg oral tablet: 2 tab(s) orally 2 times a day  Probiotic Formula oral capsule: 1 cap(s) orally once a day  Protonix 40 mg oral delayed release tablet: 1 tab(s) orally once a day  Vitamin B12: 1 tab(s) orally once a day  Vitamin B6: 1 tab(s) orally once a day  Vitamin D3 5000 intl units (125 mcg) oral tablet: 1 tab(s) orally once a day  Xanax 0.25 mg oral tablet: 1 tab(s) orally once a day, As Needed  zolpidem 10 mg oral tablet: 1 tab(s) orally once a day (at bedtime), As Needed

## 2020-03-09 NOTE — DISCHARGE NOTE PROVIDER - CARE PROVIDERS DIRECT ADDRESSES
,DirectAddress_Unknown,[email or web address]

## 2020-03-09 NOTE — DISCHARGE NOTE PROVIDER - PROVIDER TOKENS
PROVIDER:[TOKEN:[[id number]],SCHEDULEDAPPT:[03/11/2020]],PROVIDER:[TOKEN:[[id number]]]

## 2020-03-09 NOTE — DISCHARGE NOTE PROVIDER - HOSPITAL COURSE
64 y/o F with PMHx of acid reflux, HTN,HLD, Hysterectomy, R Oopherectomy, Obesity, hypothyroidism, CAD, recent STEMI, complicated by  cardiac arrest at cath lab presented to the ED after one day history of persistent palpitations associated with chest pressure and left facial numbness. Patient states she was discharged from the Hospital on 3/6/2020 but started taking prescribed medications on 3/7/202/ and noticed symptoms shortly after. Denied any dizziness, presyncope, sob, nausea, vomiting, numbness or tingling. Patient attempted to contact her Cardiologist unsuccessfully.         Vital Signs Last 24 Hrs    T(C): 36.9 (09 Mar 2020 10:57), Max: 36.9 (09 Mar 2020 10:55)    T(F): 98.4 (09 Mar 2020 10:57), Max: 98.5 (09 Mar 2020 10:55)    HR: 119 (09 Mar 2020 10:57) (58 - 119)    BP: 111/55 (09 Mar 2020 10:57) (105/61 - 111/56)    BP(mean): 69 (08 Mar 2020 18:26) (69 - 69)    RR: 18 (09 Mar 2020 10:57) (18 - 18)    SpO2: 94% (09 Mar 2020 10:57) (94% - 100%)        PHYSICAL EXAM:    General: Well developed; well nourished; in no acute distress    Eyes: PERRLA, EOMI; conjunctiva and sclera clear    Head: Normocephalic; atraumatic    ENMT: No nasal discharge; airway clear    Neck: Supple; non tender; no masses    Respiratory: No wheezes, rales or rhonchi    Cardiovascular: Regular rate and rhythm. S1 and S2 Normal; No murmurs, gallops or rubs    Gastrointestinal: Soft non-tender non-distended; Normal bowel sounds    Genitourinary: No  suprapubic  tenderness    Extremities: Normal range of motion, No clubbing, cyanosis or edema    Vascular: Peripheral pulses palpable 2+ bilaterally    Neurological: Alert and oriented x4    Skin: Warm and dry. No acute rash    Lymph Nodes: No acute cervical adenopathy    Musculoskeletal: Normal muscle tone, without deformities    Psychiatric: Cooperative and appropriate        65 year old female patient with palpitations and chest pressure in the setting of recent MI found to have elevated troponins            -Admit to Telemetry            #Chest pain    -EKG and telemetry monitor noted for PVC    -serial ekgs    -monitor on tele    -trend troponin    -patient may need BB dose titrated. Will defer to cardiology    -cardiology evaluation            #Palpitations    -pvcs noted on tele and ekg    -may need medications titrated    -cardiology to evaluate pt        #Elevated troponins    -likely secondary to demand ischemia    -currently trending down    -trend troponins    -serial ekg    -monitor on tele        #CAD    -on asa, statin, BB and plavix        #HTN    -on lisinopril        #HLD    -on statin        #DVT ppx    encourage ambulation        #Disposition    -likely discharge after cardiology evaluation and clearance 66 y/o F with PMHx of acid reflux, HTN,HLD, Hysterectomy, R Oopherectomy, Obesity, hypothyroidism, CAD, recent STEMI, complicated by   VT and in stent restenosis at cath lab, was taken back and had baloon angioplasty, she  presented to the ED after one day history of persistent palpitations associated with chest pressure and left facial numbness. She he was discharged from the Hospital on 3/6/2020 but started taking prescribed medications on 3/7/202/ and noticed symptoms shortly after. Denied any dizziness, presyncope, sob, nausea, vomiting.       In Ed Pt had another episode of palpitations, no corresponding arrhythmias except PVCs . Pt was admitted for Observation     Her Trop were min pos, likely residual from recent STEMI. Pt was monitored on tele, no events. Was evaluated by cardio and EP, plan for MCOT, metoprolol dose increased. Pt reports no complains. recommended to increase oral hydration. D/c planning and outPt f/u discussed         Vital Signs Last 24 Hrs    T(C): 36.9 (09 Mar 2020 10:57), Max: 36.9 (09 Mar 2020 10:55)    T(F): 98.4 (09 Mar 2020 10:57), Max: 98.5 (09 Mar 2020 10:55)    HR: 119 (09 Mar 2020 10:57) (58 - 119)    BP: 111/55 (09 Mar 2020 10:57) (105/61 - 111/56)    BP(mean): 69 (08 Mar 2020 18:26) (69 - 69)    RR: 18 (09 Mar 2020 10:57) (18 - 18)    SpO2: 94% (09 Mar 2020 10:57) (94% - 100%)        PHYSICAL EXAM:    General: Well developed; well nourished; in no acute distress    Eyes: PERRLA, EOMI; conjunctiva and sclera clear    Head: Normocephalic; atraumatic    ENMT: No nasal discharge; airway clear    Neck: Supple; non tender; no masses    Respiratory: No wheezes, rales or rhonchi    Cardiovascular: Regular rate and rhythm. S1 and S2 Normal; No murmurs    Gastrointestinal: Soft non-tender non-distended; Normal bowel sounds    Genitourinary: No  suprapubic  tenderness    Extremities: Normal range of motion, No  edema    Vascular: Peripheral pulses palpable 2+ bilaterally    Neurological: Alert and oriented x3, non focal     Skin: Warm and dry. No acute rash    Musculoskeletal: Normal muscle tone,     Psychiatric: Cooperative and appropriate            A/P:         1. Palpitations, likely 2/2 PVC     -monitored on tele:  SR, no events     -  monitored applied    - c/w metoprolol 50mg Po BID    - oral hydration     -F/u with Dr Mena on 3/11 and Dr Mcgowan on 4/16             2. Chest pressure.   CAD, recent STEMI     - chest pressure likely from PVCs    - trop, min elevated, flat, likely residual from STEMI     - C/w ASA, PLAVIX, BB. ACEI     -ECHO done last admission, has preserved EF         #HTN    -on lisinopril, metoprolol        #HLD    -on statin        # Hypothyroidism     C/w synthroid     TSH  checked  recently was low, will recheck     Will need to f/u with Dr Mena for results  ( spoke to Dr Mena)             Dispo: stable for d/c home today     Total time 35 min

## 2020-03-09 NOTE — DISCHARGE NOTE PROVIDER - CARE PROVIDER_API CALL
RAJIV MOLINA  Scheduled Appointment: 03/11/2020    Rubin Mcgowan (MD)  Cardiac Electrophysiology; Cardiovascular Disease  [address] [place] [address]  Phone: [phone]  Fax: [phone]  Follow Up Time:
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
## 2020-03-12 DIAGNOSIS — I25.119 ATHEROSCLEROTIC HEART DISEASE OF NATIVE CORONARY ARTERY WITH UNSPECIFIED ANGINA PECTORIS: ICD-10-CM

## 2020-03-12 DIAGNOSIS — R73.9 HYPERGLYCEMIA, UNSPECIFIED: ICD-10-CM

## 2020-03-12 DIAGNOSIS — E66.9 OBESITY, UNSPECIFIED: ICD-10-CM

## 2020-03-12 DIAGNOSIS — E78.5 HYPERLIPIDEMIA, UNSPECIFIED: ICD-10-CM

## 2020-03-12 DIAGNOSIS — I21.29 ST ELEVATION (STEMI) MYOCARDIAL INFARCTION INVOLVING OTHER SITES: ICD-10-CM

## 2020-03-12 DIAGNOSIS — E03.9 HYPOTHYROIDISM, UNSPECIFIED: ICD-10-CM

## 2020-03-12 DIAGNOSIS — I47.2 VENTRICULAR TACHYCARDIA: ICD-10-CM

## 2020-03-12 DIAGNOSIS — I10 ESSENTIAL (PRIMARY) HYPERTENSION: ICD-10-CM

## 2020-03-12 DIAGNOSIS — Z80.3 FAMILY HISTORY OF MALIGNANT NEOPLASM OF BREAST: ICD-10-CM

## 2020-03-12 DIAGNOSIS — Z79.02 LONG TERM (CURRENT) USE OF ANTITHROMBOTICS/ANTIPLATELETS: ICD-10-CM

## 2020-03-12 DIAGNOSIS — I49.3 VENTRICULAR PREMATURE DEPOLARIZATION: ICD-10-CM

## 2020-03-12 DIAGNOSIS — Z79.82 LONG TERM (CURRENT) USE OF ASPIRIN: ICD-10-CM

## 2020-03-12 DIAGNOSIS — Z90.710 ACQUIRED ABSENCE OF BOTH CERVIX AND UTERUS: ICD-10-CM

## 2020-03-12 DIAGNOSIS — T82.855A STENOSIS OF CORONARY ARTERY STENT, INITIAL ENCOUNTER: ICD-10-CM
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
## 2020-03-12 DIAGNOSIS — K21.9 GASTRO-ESOPHAGEAL REFLUX DISEASE WITHOUT ESOPHAGITIS: ICD-10-CM

## 2020-03-12 DIAGNOSIS — E87.6 HYPOKALEMIA: ICD-10-CM

## 2020-03-13 DIAGNOSIS — E03.9 HYPOTHYROIDISM, UNSPECIFIED: ICD-10-CM

## 2020-03-13 DIAGNOSIS — Z80.3 FAMILY HISTORY OF MALIGNANT NEOPLASM OF BREAST: ICD-10-CM

## 2020-03-13 DIAGNOSIS — I25.10 ATHEROSCLEROTIC HEART DISEASE OF NATIVE CORONARY ARTERY WITHOUT ANGINA PECTORIS: ICD-10-CM

## 2020-03-13 DIAGNOSIS — E66.9 OBESITY, UNSPECIFIED: ICD-10-CM

## 2020-03-13 DIAGNOSIS — I22.9 SUBSEQUENT ST ELEVATION (STEMI) MYOCARDIAL INFARCTION OF UNSPECIFIED SITE: ICD-10-CM

## 2020-03-13 DIAGNOSIS — R07.9 CHEST PAIN, UNSPECIFIED: ICD-10-CM

## 2020-03-13 DIAGNOSIS — E78.5 HYPERLIPIDEMIA, UNSPECIFIED: ICD-10-CM

## 2020-03-13 DIAGNOSIS — R79.0 ABNORMAL LEVEL OF BLOOD MINERAL: ICD-10-CM

## 2020-03-13 DIAGNOSIS — I10 ESSENTIAL (PRIMARY) HYPERTENSION: ICD-10-CM

## 2020-03-13 DIAGNOSIS — R00.2 PALPITATIONS: ICD-10-CM

## 2020-03-13 DIAGNOSIS — I49.3 VENTRICULAR PREMATURE DEPOLARIZATION: ICD-10-CM

## 2020-03-13 DIAGNOSIS — Z79.82 LONG TERM (CURRENT) USE OF ASPIRIN: ICD-10-CM

## 2020-03-13 DIAGNOSIS — I24.8 OTHER FORMS OF ACUTE ISCHEMIC HEART DISEASE: ICD-10-CM

## 2020-04-03 ENCOUNTER — APPOINTMENT (OUTPATIENT)
Dept: ELECTROPHYSIOLOGY | Facility: CLINIC | Age: 65
End: 2020-04-03

## 2020-04-28 ENCOUNTER — APPOINTMENT (OUTPATIENT)
Dept: ELECTROPHYSIOLOGY | Facility: CLINIC | Age: 65
End: 2020-04-28
Payer: MEDICARE

## 2020-04-28 DIAGNOSIS — R00.2 PALPITATIONS: ICD-10-CM

## 2020-04-28 PROCEDURE — 93228 REMOTE 30 DAY ECG REV/REPORT: CPT

## 2020-04-30 ENCOUNTER — APPOINTMENT (OUTPATIENT)
Dept: ELECTROPHYSIOLOGY | Facility: CLINIC | Age: 65
End: 2020-04-30

## 2020-04-30 ENCOUNTER — APPOINTMENT (OUTPATIENT)
Dept: ELECTROPHYSIOLOGY | Facility: CLINIC | Age: 65
End: 2020-04-30
Payer: MEDICARE

## 2020-04-30 DIAGNOSIS — I47.2 VENTRICULAR TACHYCARDIA: ICD-10-CM

## 2020-04-30 DIAGNOSIS — I25.2 OLD MYOCARDIAL INFARCTION: ICD-10-CM

## 2020-04-30 PROCEDURE — 99203 OFFICE O/P NEW LOW 30 MIN: CPT | Mod: 95

## 2020-04-30 PROCEDURE — 99213 OFFICE O/P EST LOW 20 MIN: CPT | Mod: 95

## 2020-04-30 RX ORDER — ZOLPIDEM TARTRATE 10 MG/1
10 TABLET ORAL
Qty: 30 | Refills: 0 | Status: ACTIVE | COMMUNITY
Start: 2020-04-01

## 2020-04-30 RX ORDER — AMLODIPINE BESYLATE 2.5 MG/1
2.5 TABLET ORAL
Qty: 90 | Refills: 0 | Status: ACTIVE | COMMUNITY
Start: 2020-02-05

## 2020-04-30 RX ORDER — ATORVASTATIN CALCIUM 40 MG/1
40 TABLET, FILM COATED ORAL
Qty: 90 | Refills: 0 | Status: ACTIVE | COMMUNITY
Start: 2020-04-01

## 2020-04-30 RX ORDER — TRIAMCINOLONE ACETONIDE 1 MG/G
0.1 OINTMENT TOPICAL
Qty: 30 | Refills: 0 | Status: ACTIVE | COMMUNITY
Start: 2020-04-08

## 2020-04-30 RX ORDER — ALPRAZOLAM 0.25 MG/1
0.25 TABLET ORAL
Qty: 30 | Refills: 0 | Status: ACTIVE | COMMUNITY
Start: 2020-04-01

## 2020-04-30 RX ORDER — LISINOPRIL 5 MG/1
5 TABLET ORAL
Qty: 90 | Refills: 0 | Status: ACTIVE | COMMUNITY
Start: 2020-04-01

## 2020-04-30 RX ORDER — LEVOTHYROXINE SODIUM 0.12 MG/1
125 TABLET ORAL
Qty: 90 | Refills: 0 | Status: ACTIVE | COMMUNITY
Start: 2020-01-29

## 2020-04-30 RX ORDER — MUPIROCIN 20 MG/G
2 OINTMENT TOPICAL
Qty: 22 | Refills: 0 | Status: ACTIVE | COMMUNITY
Start: 2020-04-22

## 2020-04-30 RX ORDER — PANTOPRAZOLE 40 MG/1
40 TABLET, DELAYED RELEASE ORAL
Qty: 90 | Refills: 0 | Status: ACTIVE | COMMUNITY
Start: 2019-11-18

## 2020-04-30 RX ORDER — CLOPIDOGREL BISULFATE 75 MG/1
75 TABLET, FILM COATED ORAL
Qty: 90 | Refills: 0 | Status: ACTIVE | COMMUNITY
Start: 2020-04-01

## 2020-04-30 RX ORDER — METOPROLOL TARTRATE 50 MG/1
50 TABLET, FILM COATED ORAL
Qty: 180 | Refills: 0 | Status: ACTIVE | COMMUNITY
Start: 2020-03-23

## 2020-05-01 NOTE — ASSESSMENT
[FreeTextEntry1] : This is a 65 year old woman with an acute MI complicated by acute stent thrombosis and VT/VF arrest. MCOT demonstrates symptomatic NSVT. She has intact LV function and is on Beta blocker. Would recommend an EP study to risk stratify.

## 2020-05-01 NOTE — HISTORY OF PRESENT ILLNESS
[FreeTextEntry1] : This is a 65 year old woman with acid reflux, HTN, HLD, Hysterectomy, R Oophorectomy, obesity, hypothyroidism, CAD, STEMI 3/2/2020, complicated by VT and acute in stent restenosis at cath lab holding area. She has had palpitations as well as episodes of lightheadedness and dizziness. MCOT demonstrates runs of NSVT.

## 2021-03-12 DIAGNOSIS — Z01.818 ENCOUNTER FOR OTHER PREPROCEDURAL EXAMINATION: ICD-10-CM

## 2021-03-13 ENCOUNTER — APPOINTMENT (OUTPATIENT)
Dept: DISASTER EMERGENCY | Facility: CLINIC | Age: 66
End: 2021-03-13

## 2021-03-13 LAB — SARS-COV-2 N GENE NPH QL NAA+PROBE: NOT DETECTED

## 2021-03-16 ENCOUNTER — OUTPATIENT (OUTPATIENT)
Dept: OUTPATIENT SERVICES | Facility: HOSPITAL | Age: 66
LOS: 1 days | Discharge: HOME | End: 2021-03-16

## 2021-03-16 VITALS
TEMPERATURE: 97 F | SYSTOLIC BLOOD PRESSURE: 140 MMHG | HEART RATE: 49 BPM | RESPIRATION RATE: 21 BRPM | OXYGEN SATURATION: 98 % | WEIGHT: 192.9 LBS | DIASTOLIC BLOOD PRESSURE: 50 MMHG | HEIGHT: 62 IN

## 2021-03-16 DIAGNOSIS — Z90.721 ACQUIRED ABSENCE OF OVARIES, UNILATERAL: Chronic | ICD-10-CM

## 2021-03-16 DIAGNOSIS — Z90.710 ACQUIRED ABSENCE OF BOTH CERVIX AND UTERUS: Chronic | ICD-10-CM

## 2021-03-16 DIAGNOSIS — Z95.5 PRESENCE OF CORONARY ANGIOPLASTY IMPLANT AND GRAFT: Chronic | ICD-10-CM

## 2021-03-16 DIAGNOSIS — Z90.89 ACQUIRED ABSENCE OF OTHER ORGANS: Chronic | ICD-10-CM

## 2021-03-16 DIAGNOSIS — Z95.810 PRESENCE OF AUTOMATIC (IMPLANTABLE) CARDIAC DEFIBRILLATOR: Chronic | ICD-10-CM

## 2021-03-16 DIAGNOSIS — Z12.11 ENCOUNTER FOR SCREENING FOR MALIGNANT NEOPLASM OF COLON: ICD-10-CM

## 2021-03-16 NOTE — ASU PATIENT PROFILE, ADULT - PMH
HTN (hypertension)    Hypothyroid     CAD (coronary artery disease)    HTN (hypertension)    Hypothyroid

## 2021-03-16 NOTE — ASU PATIENT PROFILE, ADULT - PSH
AICD (automatic cardioverter/defibrillator) present    Coronary stent patent    H/O unilateral oophorectomy  right - 1992  H/O vaginal hysterectomy  2014  History of tonsillectomy  age 5

## 2021-03-23 DIAGNOSIS — E78.00 PURE HYPERCHOLESTEROLEMIA, UNSPECIFIED: ICD-10-CM

## 2021-03-23 DIAGNOSIS — M19.90 UNSPECIFIED OSTEOARTHRITIS, UNSPECIFIED SITE: ICD-10-CM

## 2021-03-23 DIAGNOSIS — K57.30 DIVERTICULOSIS OF LARGE INTESTINE WITHOUT PERFORATION OR ABSCESS WITHOUT BLEEDING: ICD-10-CM

## 2021-03-23 DIAGNOSIS — I10 ESSENTIAL (PRIMARY) HYPERTENSION: ICD-10-CM

## 2021-03-23 DIAGNOSIS — Z90.710 ACQUIRED ABSENCE OF BOTH CERVIX AND UTERUS: ICD-10-CM

## 2021-03-23 DIAGNOSIS — Z79.02 LONG TERM (CURRENT) USE OF ANTITHROMBOTICS/ANTIPLATELETS: ICD-10-CM

## 2021-03-23 DIAGNOSIS — Z12.11 ENCOUNTER FOR SCREENING FOR MALIGNANT NEOPLASM OF COLON: ICD-10-CM

## 2021-03-23 DIAGNOSIS — E03.9 HYPOTHYROIDISM, UNSPECIFIED: ICD-10-CM

## 2021-03-23 DIAGNOSIS — K64.8 OTHER HEMORRHOIDS: ICD-10-CM

## 2021-03-23 DIAGNOSIS — Z95.5 PRESENCE OF CORONARY ANGIOPLASTY IMPLANT AND GRAFT: ICD-10-CM

## 2021-03-23 DIAGNOSIS — Z83.71 FAMILY HISTORY OF COLONIC POLYPS: ICD-10-CM

## 2021-03-23 DIAGNOSIS — I25.2 OLD MYOCARDIAL INFARCTION: ICD-10-CM

## 2021-03-23 DIAGNOSIS — Z79.82 LONG TERM (CURRENT) USE OF ASPIRIN: ICD-10-CM

## 2021-03-23 DIAGNOSIS — K64.4 RESIDUAL HEMORRHOIDAL SKIN TAGS: ICD-10-CM

## 2021-03-23 DIAGNOSIS — Z95.810 PRESENCE OF AUTOMATIC (IMPLANTABLE) CARDIAC DEFIBRILLATOR: ICD-10-CM

## 2021-04-05 NOTE — ED ADULT NURSE NOTE - CAS TRG GEN SKIN COLOR
Nayeli Younger MD your patient has been admitted to the [facility] in [place] on Admit Date:04/04/2021 Admit Time: 1538. Please contact [phone] with any questions. Thank you.     Normal for race
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
## 2024-02-23 PROBLEM — I25.10 ATHEROSCLEROTIC HEART DISEASE OF NATIVE CORONARY ARTERY WITHOUT ANGINA PECTORIS: Chronic | Status: ACTIVE | Noted: 2021-03-16

## 2024-03-08 ENCOUNTER — RESULT REVIEW (OUTPATIENT)
Age: 69
End: 2024-03-08

## 2024-03-08 ENCOUNTER — TRANSCRIPTION ENCOUNTER (OUTPATIENT)
Age: 69
End: 2024-03-08

## 2024-03-08 ENCOUNTER — OUTPATIENT (OUTPATIENT)
Dept: INPATIENT UNIT | Facility: HOSPITAL | Age: 69
LOS: 1 days | Discharge: HOME | End: 2024-03-08
Payer: MEDICARE

## 2024-03-08 VITALS
DIASTOLIC BLOOD PRESSURE: 60 MMHG | TEMPERATURE: 98 F | SYSTOLIC BLOOD PRESSURE: 124 MMHG | OXYGEN SATURATION: 97 % | RESPIRATION RATE: 18 BRPM | HEART RATE: 56 BPM

## 2024-03-08 VITALS
WEIGHT: 186.07 LBS | RESPIRATION RATE: 16 BRPM | OXYGEN SATURATION: 98 % | SYSTOLIC BLOOD PRESSURE: 158 MMHG | HEIGHT: 55 IN | TEMPERATURE: 99 F | HEART RATE: 48 BPM | DIASTOLIC BLOOD PRESSURE: 72 MMHG

## 2024-03-08 DIAGNOSIS — R94.5 ABNORMAL RESULTS OF LIVER FUNCTION STUDIES: ICD-10-CM

## 2024-03-08 DIAGNOSIS — Z95.5 PRESENCE OF CORONARY ANGIOPLASTY IMPLANT AND GRAFT: Chronic | ICD-10-CM

## 2024-03-08 DIAGNOSIS — Z95.810 PRESENCE OF AUTOMATIC (IMPLANTABLE) CARDIAC DEFIBRILLATOR: Chronic | ICD-10-CM

## 2024-03-08 DIAGNOSIS — Z90.721 ACQUIRED ABSENCE OF OVARIES, UNILATERAL: Chronic | ICD-10-CM

## 2024-03-08 DIAGNOSIS — Z90.710 ACQUIRED ABSENCE OF BOTH CERVIX AND UTERUS: Chronic | ICD-10-CM

## 2024-03-08 DIAGNOSIS — Z90.89 ACQUIRED ABSENCE OF OTHER ORGANS: Chronic | ICD-10-CM

## 2024-03-08 DIAGNOSIS — R79.89 OTHER SPECIFIED ABNORMAL FINDINGS OF BLOOD CHEMISTRY: ICD-10-CM

## 2024-03-08 LAB
ANION GAP SERPL CALC-SCNC: 6 MMOL/L — SIGNIFICANT CHANGE UP (ref 5–17)
BUN SERPL-MCNC: 13 MG/DL — SIGNIFICANT CHANGE UP (ref 7–23)
CALCIUM SERPL-MCNC: 9.3 MG/DL — SIGNIFICANT CHANGE UP (ref 8.5–10.1)
CHLORIDE SERPL-SCNC: 110 MMOL/L — HIGH (ref 96–108)
CO2 SERPL-SCNC: 24 MMOL/L — SIGNIFICANT CHANGE UP (ref 22–31)
CREAT SERPL-MCNC: 0.78 MG/DL — SIGNIFICANT CHANGE UP (ref 0.5–1.3)
EGFR: 82 ML/MIN/1.73M2 — SIGNIFICANT CHANGE UP
GLUCOSE SERPL-MCNC: 99 MG/DL — SIGNIFICANT CHANGE UP (ref 70–99)
HCT VFR BLD CALC: 39 % — SIGNIFICANT CHANGE UP (ref 34.5–45)
HGB BLD-MCNC: 13.1 G/DL — SIGNIFICANT CHANGE UP (ref 11.5–15.5)
INR BLD: 1.04 RATIO — SIGNIFICANT CHANGE UP (ref 0.85–1.18)
MCHC RBC-ENTMCNC: 31.3 PG — SIGNIFICANT CHANGE UP (ref 27–34)
MCHC RBC-ENTMCNC: 33.6 GM/DL — SIGNIFICANT CHANGE UP (ref 32–36)
MCV RBC AUTO: 93.3 FL — SIGNIFICANT CHANGE UP (ref 80–100)
PLATELET # BLD AUTO: 206 K/UL — SIGNIFICANT CHANGE UP (ref 150–400)
POTASSIUM SERPL-MCNC: 3.8 MMOL/L — SIGNIFICANT CHANGE UP (ref 3.5–5.3)
POTASSIUM SERPL-SCNC: 3.8 MMOL/L — SIGNIFICANT CHANGE UP (ref 3.5–5.3)
PROTHROM AB SERPL-ACNC: 11.7 SEC — SIGNIFICANT CHANGE UP (ref 9.5–13)
RBC # BLD: 4.18 M/UL — SIGNIFICANT CHANGE UP (ref 3.8–5.2)
RBC # FLD: 13.7 % — SIGNIFICANT CHANGE UP (ref 10.3–14.5)
SODIUM SERPL-SCNC: 140 MMOL/L — SIGNIFICANT CHANGE UP (ref 135–145)
WBC # BLD: 5.01 K/UL — SIGNIFICANT CHANGE UP (ref 3.8–10.5)
WBC # FLD AUTO: 5.01 K/UL — SIGNIFICANT CHANGE UP (ref 3.8–10.5)

## 2024-03-08 PROCEDURE — 93010 ELECTROCARDIOGRAM REPORT: CPT

## 2024-03-08 PROCEDURE — 88342 IMHCHEM/IMCYTCHM 1ST ANTB: CPT

## 2024-03-08 PROCEDURE — 88307 TISSUE EXAM BY PATHOLOGIST: CPT

## 2024-03-08 PROCEDURE — 88342 IMHCHEM/IMCYTCHM 1ST ANTB: CPT | Mod: 26

## 2024-03-08 PROCEDURE — C1889: CPT

## 2024-03-08 PROCEDURE — 88313 SPECIAL STAINS GROUP 2: CPT | Mod: 26

## 2024-03-08 PROCEDURE — 85027 COMPLETE CBC AUTOMATED: CPT

## 2024-03-08 PROCEDURE — 88313 SPECIAL STAINS GROUP 2: CPT

## 2024-03-08 PROCEDURE — 88307 TISSUE EXAM BY PATHOLOGIST: CPT | Mod: 26

## 2024-03-08 PROCEDURE — 88341 IMHCHEM/IMCYTCHM EA ADD ANTB: CPT

## 2024-03-08 PROCEDURE — 76942 ECHO GUIDE FOR BIOPSY: CPT

## 2024-03-08 PROCEDURE — 36415 COLL VENOUS BLD VENIPUNCTURE: CPT

## 2024-03-08 PROCEDURE — 47000 NEEDLE BIOPSY OF LIVER PERQ: CPT

## 2024-03-08 PROCEDURE — 88341 IMHCHEM/IMCYTCHM EA ADD ANTB: CPT | Mod: 26

## 2024-03-08 PROCEDURE — 85610 PROTHROMBIN TIME: CPT

## 2024-03-08 PROCEDURE — 80048 BASIC METABOLIC PNL TOTAL CA: CPT

## 2024-03-08 PROCEDURE — 93005 ELECTROCARDIOGRAM TRACING: CPT

## 2024-03-08 PROCEDURE — 76942 ECHO GUIDE FOR BIOPSY: CPT | Mod: 26

## 2024-03-08 RX ORDER — METOPROLOL TARTRATE 50 MG
2 TABLET ORAL
Qty: 0 | Refills: 0 | DISCHARGE

## 2024-03-08 RX ORDER — OXYCODONE HYDROCHLORIDE 5 MG/1
5 TABLET ORAL ONCE
Refills: 0 | Status: DISCONTINUED | OUTPATIENT
Start: 2024-03-08 | End: 2024-03-08

## 2024-03-08 RX ORDER — SODIUM CHLORIDE 9 MG/ML
1000 INJECTION, SOLUTION INTRAVENOUS
Refills: 0 | Status: DISCONTINUED | OUTPATIENT
Start: 2024-03-08 | End: 2024-03-08

## 2024-03-08 RX ORDER — FENTANYL CITRATE 50 UG/ML
50 INJECTION INTRAVENOUS
Refills: 0 | Status: DISCONTINUED | OUTPATIENT
Start: 2024-03-08 | End: 2024-03-08

## 2024-03-08 RX ORDER — PYRIDOXINE HCL (VITAMIN B6) 100 MG
1 TABLET ORAL
Qty: 0 | Refills: 0 | DISCHARGE

## 2024-03-08 RX ORDER — FAMOTIDINE 10 MG/ML
1 INJECTION INTRAVENOUS
Refills: 0 | DISCHARGE

## 2024-03-08 RX ORDER — LANOLIN ALCOHOL/MO/W.PET/CERES
2 CREAM (GRAM) TOPICAL
Qty: 0 | Refills: 0 | DISCHARGE

## 2024-03-08 RX ORDER — ONDANSETRON 8 MG/1
4 TABLET, FILM COATED ORAL ONCE
Refills: 0 | Status: DISCONTINUED | OUTPATIENT
Start: 2024-03-08 | End: 2024-03-08

## 2024-03-08 RX ORDER — ZOLPIDEM TARTRATE 10 MG/1
1 TABLET ORAL
Qty: 0 | Refills: 0 | DISCHARGE

## 2024-03-08 RX ORDER — ACETAMINOPHEN 500 MG
1000 TABLET ORAL ONCE
Refills: 0 | Status: DISCONTINUED | OUTPATIENT
Start: 2024-03-08 | End: 2024-03-08

## 2024-03-08 RX ORDER — PREGABALIN 225 MG/1
1 CAPSULE ORAL
Qty: 0 | Refills: 0 | DISCHARGE

## 2024-03-08 RX ORDER — CHOLECALCIFEROL (VITAMIN D3) 125 MCG
1 CAPSULE ORAL
Qty: 0 | Refills: 0 | DISCHARGE

## 2024-03-08 RX ORDER — LEVOTHYROXINE SODIUM 125 MCG
1 TABLET ORAL
Qty: 0 | Refills: 0 | DISCHARGE

## 2024-03-08 RX ORDER — ALPRAZOLAM 0.25 MG
1 TABLET ORAL
Qty: 0 | Refills: 0 | DISCHARGE

## 2024-03-08 RX ORDER — L.ACIDOPH/B.ANIMALIS/B.LONGUM 15B CELL
1 CAPSULE ORAL
Qty: 0 | Refills: 0 | DISCHARGE

## 2024-03-08 RX ORDER — PANTOPRAZOLE SODIUM 20 MG/1
1 TABLET, DELAYED RELEASE ORAL
Qty: 0 | Refills: 0 | DISCHARGE

## 2024-03-08 NOTE — ASU PATIENT PROFILE, ADULT - NSICDXPASTSURGICALHX_GEN_ALL_CORE_FT
PAST SURGICAL HISTORY:  AICD (automatic cardioverter/defibrillator) present     Coronary stent patent     H/O unilateral oophorectomy right - 1992    H/O vaginal hysterectomy 2014    History of tonsillectomy age 5

## 2024-03-08 NOTE — ASU PATIENT PROFILE, ADULT - NSICDXPASTMEDICALHX_GEN_ALL_CORE_FT
PAST MEDICAL HISTORY:  CAD (coronary artery disease)     Cardiac arrest     GERD (gastroesophageal reflux disease)     HTN (hypertension)     Hypothyroid     Rosacea     SVT (supraventricular tachycardia)

## 2024-03-08 NOTE — ASU DISCHARGE PLAN (ADULT/PEDIATRIC) - NS MD DC FALL RISK RISK
For information on Fall & Injury Prevention, visit: [email or web address] OR  [email or web address] OR  https://www.cdc.gov/steadi/patient.html

## 2024-03-28 LAB — SURGICAL PATHOLOGY STUDY: SIGNIFICANT CHANGE UP

## 2024-09-27 PROBLEM — I47.10 SUPRAVENTRICULAR TACHYCARDIA, UNSPECIFIED: Chronic | Status: ACTIVE | Noted: 2024-03-07

## 2024-09-27 PROBLEM — K21.9 GASTRO-ESOPHAGEAL REFLUX DISEASE WITHOUT ESOPHAGITIS: Chronic | Status: ACTIVE | Noted: 2024-03-07

## 2024-09-27 PROBLEM — L71.9 ROSACEA, UNSPECIFIED: Chronic | Status: ACTIVE | Noted: 2024-03-07

## 2024-10-01 ENCOUNTER — OUTPATIENT (OUTPATIENT)
Dept: OUTPATIENT SERVICES | Facility: HOSPITAL | Age: 69
LOS: 1 days | Discharge: HOME | End: 2024-10-01
Payer: MEDICARE

## 2024-10-01 VITALS
TEMPERATURE: 97 F | RESPIRATION RATE: 13 BRPM | DIASTOLIC BLOOD PRESSURE: 92 MMHG | WEIGHT: 190.04 LBS | HEIGHT: 62 IN | SYSTOLIC BLOOD PRESSURE: 170 MMHG | HEART RATE: 69 BPM

## 2024-10-01 DIAGNOSIS — Z90.89 ACQUIRED ABSENCE OF OTHER ORGANS: Chronic | ICD-10-CM

## 2024-10-01 DIAGNOSIS — Z90.721 ACQUIRED ABSENCE OF OVARIES, UNILATERAL: Chronic | ICD-10-CM

## 2024-10-01 DIAGNOSIS — Z95.810 PRESENCE OF AUTOMATIC (IMPLANTABLE) CARDIAC DEFIBRILLATOR: Chronic | ICD-10-CM

## 2024-10-01 DIAGNOSIS — K75.4 AUTOIMMUNE HEPATITIS: ICD-10-CM

## 2024-10-01 DIAGNOSIS — Z95.5 PRESENCE OF CORONARY ANGIOPLASTY IMPLANT AND GRAFT: Chronic | ICD-10-CM

## 2024-10-01 DIAGNOSIS — K62.5 HEMORRHAGE OF ANUS AND RECTUM: ICD-10-CM

## 2024-10-01 DIAGNOSIS — Z90.710 ACQUIRED ABSENCE OF BOTH CERVIX AND UTERUS: Chronic | ICD-10-CM

## 2024-10-01 PROCEDURE — 88305 TISSUE EXAM BY PATHOLOGIST: CPT | Mod: 26

## 2024-10-01 PROCEDURE — 88305 TISSUE EXAM BY PATHOLOGIST: CPT

## 2024-10-01 NOTE — ASU PATIENT PROFILE, ADULT - NSICDXPASTMEDICALHX_GEN_ALL_CORE_FT
PAST MEDICAL HISTORY:  CAD (coronary artery disease)     Cardiac arrest 2020    Diverticulosis     GERD (gastroesophageal reflux disease)     H/O hemorrhoids     HTN (hypertension)     Hypothyroid     Rosacea     SVT (supraventricular tachycardia)

## 2024-10-01 NOTE — ASU PREOP CHECKLIST - 1.
md acosta and md bobo made aware that pt was not very clear on medication or health hx . pt reported to holding her bp meds 5 days ..

## 2024-10-01 NOTE — ASU PATIENT PROFILE, ADULT - AS SC BRADEN MOBILITY
B/P: 124/88  EK2020  Continue Triamterene-HCTZ 37.5-25 mg daily  DASH diet  Encouraged home blood pressure monitoring  
BMI: 36.37  TSH: 1.3  Vitamin D level: 26  HgbA1c: 8.4  Weight Loss Encouraged  Increase Physical Activity  
Chronic generalized joint pain  Bilateral Hand X-ray negative  Right Knee X-ray negative  Rheumatoid factors pending  Continue Tylenol prn pain  Orthopedic referral ordered for right knee pain  
Continue Metformin 1,000 mg bid  Start Ozempic 0.25 mg weekly  Continue home CBG monitoring.  Hypoglycemic episodes: denies  BMI: 36.37  HgbA1c: 8.4  UA Creatinine: 81.1  UA Microalbumin: 48.7  On Atorvastatin  Weight Loss Encouraged  ADA Diet  
Continue Atorvastatin 20 mg daily  Weight loss encouraged  Low fat/high fiber diet  Increase physical activity   Latest Reference Range & Units 12/01/22 11:46   Cholesterol <=200 mg/dL 124   HDL 35 - 60 mg/dL 45   LDL Cholesterol External 50.00 - 140.00 mg/dL 59.00   Total Cholesterol/HDL Ratio 0 - 5  3   Triglycerides 37 - 140 mg/dL 98   Very Low Density Lipoprotein  20     
Followed by Cardiology  [facility]  Continue Warfarin as prescribed  
Followed by Cardiology Clinic  
Mental Health referral ordered 08/2022, will follow up on referral  Continue Fluoxetine 20 mg daily, Trazodone 100 mg Qhs prn  
TSH: 1.3  Continue Levothyroxine 150 mcg daily  
Vitamin D level: 26  Start Vitamin D 50,000 units weekly  
(4) no limitation
never

## 2024-10-03 DIAGNOSIS — E03.9 HYPOTHYROIDISM, UNSPECIFIED: ICD-10-CM

## 2024-10-03 DIAGNOSIS — D12.5 BENIGN NEOPLASM OF SIGMOID COLON: ICD-10-CM

## 2024-10-03 DIAGNOSIS — K62.5 HEMORRHAGE OF ANUS AND RECTUM: ICD-10-CM

## 2024-10-03 DIAGNOSIS — K64.8 OTHER HEMORRHOIDS: ICD-10-CM

## 2024-10-03 DIAGNOSIS — I25.10 ATHEROSCLEROTIC HEART DISEASE OF NATIVE CORONARY ARTERY WITHOUT ANGINA PECTORIS: ICD-10-CM

## 2024-10-03 DIAGNOSIS — Z83.719 FAMILY HISTORY OF COLON POLYPS, UNSPECIFIED: ICD-10-CM

## 2024-10-03 DIAGNOSIS — I25.2 OLD MYOCARDIAL INFARCTION: ICD-10-CM

## 2024-10-03 DIAGNOSIS — E78.00 PURE HYPERCHOLESTEROLEMIA, UNSPECIFIED: ICD-10-CM

## 2024-10-03 DIAGNOSIS — Z79.82 LONG TERM (CURRENT) USE OF ASPIRIN: ICD-10-CM

## 2024-10-03 DIAGNOSIS — Z79.02 LONG TERM (CURRENT) USE OF ANTITHROMBOTICS/ANTIPLATELETS: ICD-10-CM

## 2024-10-03 DIAGNOSIS — I10 ESSENTIAL (PRIMARY) HYPERTENSION: ICD-10-CM

## 2024-10-03 DIAGNOSIS — K64.4 RESIDUAL HEMORRHOIDAL SKIN TAGS: ICD-10-CM

## 2024-10-03 DIAGNOSIS — K21.9 GASTRO-ESOPHAGEAL REFLUX DISEASE WITHOUT ESOPHAGITIS: ICD-10-CM

## 2024-10-03 DIAGNOSIS — Z95.5 PRESENCE OF CORONARY ANGIOPLASTY IMPLANT AND GRAFT: ICD-10-CM

## 2024-10-03 DIAGNOSIS — Z79.890 HORMONE REPLACEMENT THERAPY: ICD-10-CM

## 2024-10-03 LAB — SURGICAL PATHOLOGY STUDY: SIGNIFICANT CHANGE UP

## 2024-10-08 NOTE — PATIENT PROFILE ADULT - NSPROGENOTHERPROVIDER_GEN_A_NUR
[facility]  [address] [place] [address]  Phone: [phone]  Fax: [phone]      Assessment and Plan     Assessment & Plan  Primary hypertension  -BP in the office is controlled  -On Clonidine  -Continue current management  -Follows up with a Cardiologist  -Follow up in 6 months     Anxiety  -Problem is improving with treatment  -On Zoloft  -Continue current management    Healthcare maintenance  Orders:    CBC with Automated Differential; Future    Comprehensive Metabolic Panel; Future    Screening for lipid disorders  Orders:    Lipid Panel With Reflex; Future    Screening for thyroid disorder  Orders:    Thyroid Stimulating Hormone Reflex; Future      Return in about 6 months (around 4/8/2025) for Hypertension (BP check).      Plan and goals were discussed with the patient and all questions were answered to her satisfaction.  The purpose and side effects of any new medication(s) prescribed today were discussed.      Electronically signed by: Cristina Ponce MD  10/8/2024    Subjective     Chief Complaint   Italia Smith is a 35 year old female presenting with   Chief Complaint   Patient presents with    Office Visit     5 week fu          History of Present Illness     Patient presents to the clinic today with the following concern(s):  1) Follow up - Hypertension; Anxiety     Patient is unaccompanied today.    Hypertension  -Med(s): Clonidine 0.1 mg TID; no longer taking Amlodipine 5 mg   -Tolerates medication(s) well. Denies medication side effects.  -BP in the office: 120/80 mm Hg   -BP range at home: 110s/60s mm Hg   -Cardiologist: Dr. Rod    Anxiety  -Med(s): Zoloft 50 mg  -Tolerates medication(s) well. Denies medication side effects.   -She has been taking the medication since April/May 2024. It was prescribed by her Cardiologist.   -Denies panic attacks  -Denies suicidal ideations and homicidal ideations  -Patient doesn’t see a Therapist        Histories  I have reviewed the past medical, surgical, family, and social history sections including the medications and allergies listed in the above medical record as well as the nursing notes.    Medications  Current Outpatient Medications   Medication Sig Dispense Refill    cloNIDine (CATAPRES) 0.1 MG tablet Take 1 tablet by mouth 3 times per day      sertraline (ZOLOFT) 50 MG tablet Take 50 mg by mouth every morning.      COLLAGEN PO Take 1 tablet by mouth daily.      Vitamin D-Vitamin K (D3 + K2 PO) Take 1 tablet by mouth daily.      Ferrous Sulfate (SLOW FE PO)        No current facility-administered medications for this visit.       Review of Systems   Review of Systems   Constitutional:  Negative for fatigue and fever.   Respiratory:  Negative for shortness of breath.    Cardiovascular:  Negative for chest pain and palpitations.   Gastrointestinal:  Negative for abdominal pain, constipation, diarrhea, nausea and vomiting.   Neurological:  Negative for dizziness and headaches.         Objective     Visit Vitals  /80   Pulse 63   Temp 97.7 °F (36.5 °C) (Temporal)   Resp 16   Ht 5' 4\" (1.626 m)   Wt 60.4 kg (133 lb 3.2 oz)   LMP 10/06/2024 (Exact Date)   SpO2 100%   BMI 22.86 kg/m²         Physical Exam  Vitals reviewed.   Constitutional:       General: She is not in acute distress.  HENT:      Head: Normocephalic and atraumatic.      Right Ear: External ear normal.      Left Ear: External ear normal.      Neck: Neck supple.   Eyes:      Extraocular Movements: Extraocular movements intact.   Cardiovascular:      Rate and Rhythm: Normal rate and regular rhythm.   Pulmonary:      Effort: Pulmonary effort is normal.      Breath sounds: Normal breath sounds.   Skin:     General: Skin is warm and dry.   Neurological:      Mental Status: She is alert and oriented to person, place, and time.   Psychiatric:         Behavior: Behavior is cooperative.         Labs and Results  No results found for this or  any previous visit (from the past 672 hour(s)).   none